# Patient Record
Sex: FEMALE | Race: WHITE | NOT HISPANIC OR LATINO | Employment: FULL TIME | URBAN - METROPOLITAN AREA
[De-identification: names, ages, dates, MRNs, and addresses within clinical notes are randomized per-mention and may not be internally consistent; named-entity substitution may affect disease eponyms.]

---

## 2017-06-22 ENCOUNTER — OFFICE VISIT (OUTPATIENT)
Dept: NEUROLOGY | Facility: CLINIC | Age: 42
End: 2017-06-22
Payer: COMMERCIAL

## 2017-06-22 VITALS
RESPIRATION RATE: 18 BRPM | DIASTOLIC BLOOD PRESSURE: 87 MMHG | WEIGHT: 200.19 LBS | HEART RATE: 93 BPM | BODY MASS INDEX: 35.47 KG/M2 | HEIGHT: 63 IN | SYSTOLIC BLOOD PRESSURE: 126 MMHG

## 2017-06-22 DIAGNOSIS — G43.719 CHRONIC MIGRAINE WITHOUT AURA, WITH INTRACTABLE MIGRAINE, SO STATED, WITHOUT MENTION OF STATUS MIGRAINOSUS: Primary | ICD-10-CM

## 2017-06-22 DIAGNOSIS — G47.9 DISORDERED SLEEP: ICD-10-CM

## 2017-06-22 DIAGNOSIS — Z72.0 TOBACCO ABUSE: ICD-10-CM

## 2017-06-22 DIAGNOSIS — I10 ESSENTIAL HYPERTENSION: ICD-10-CM

## 2017-06-22 PROCEDURE — 99999 PR PBB SHADOW E&M-EST. PATIENT-LVL III: CPT | Mod: PBBFAC,,, | Performed by: PSYCHIATRY & NEUROLOGY

## 2017-06-22 PROCEDURE — 99204 OFFICE O/P NEW MOD 45 MIN: CPT | Mod: S$GLB,,, | Performed by: PSYCHIATRY & NEUROLOGY

## 2017-06-22 RX ORDER — BUTALBITAL, ASPIRIN, AND CAFFEINE 325; 50; 40 MG/1; MG/1; MG/1
1 CAPSULE ORAL EVERY 6 HOURS PRN
Qty: 10 CAPSULE | Refills: 3 | Status: SHIPPED | OUTPATIENT
Start: 2017-06-22 | End: 2017-07-22

## 2017-06-22 RX ORDER — TOPIRAMATE 100 MG/1
100 CAPSULE, EXTENDED RELEASE ORAL DAILY
Qty: 30 CAPSULE | Refills: 11 | Status: SHIPPED | OUTPATIENT
Start: 2017-06-22 | End: 2017-07-27

## 2017-06-22 RX ORDER — ZOLMITRIPTAN 5 MG/1
1 SPRAY NASAL ONCE AS NEEDED
Qty: 6 EACH | Refills: 3 | Status: SHIPPED | OUTPATIENT
Start: 2017-06-22 | End: 2018-01-25

## 2017-06-22 RX ORDER — TOPIRAMATE 50 MG/1
50 TABLET, FILM COATED ORAL DAILY
COMMUNITY
End: 2017-06-22 | Stop reason: ALTCHOICE

## 2017-06-22 NOTE — PROGRESS NOTES
Please Check any Medications Tried for Headache    AED Neuromodulators  MAOIs  Ergot Alkaloids    Acetazolamide (Diamox) [] Phenelzine (Nardil) [] Dihydroergotamine (Migranal) [x]   Carbamazepine (Tegretol) [] Tranylcypromine (Parnate) [] Ergotamine (Ergomar) []   Gabapentin (Neurontin) [] Antihistamine/Serotonergic  Triptans    Lacosamide (Vimpat) [] Cyproheptadine (Periactin) [] Almotriptan (Axert) []   Lamotrigine (Lamictal) [] Antihypertensives  Eletriptan (Relpax) [x]   Levatiracetam (Keppra) [] Atenolol (Tenormin) [] Frovatriptan (Frova) []   Oxcarbazepine (Trileptal) [] Bisoprostol (Zebeta) [] Naratriptan (Amerge) []   Phenobarbital [] Candesartan (Atacand) [] Rizatriptan (Maxalt) [x]   Phenytoin (Dilantin) [x] Diltiazem (Cardizem) [] Sumatriptan (Imitrex) [x]   Pregabalin (Lyrica) [] Lisinopril (Prinivil, Zestril) [x] Zolmitriptan (Zomig) [x]   Primidone (Mysoline) [] Metoprolol (Toprol) [x] Combo Abortives    Tiagabine (Gabatril) [] Nadolol (Corgard) [] Butalbital and Acetaminophen (Bupap) []   Topiramate (Topamax)  (Trokendi) [x] Nicardipine (Cardene) []     Vigabatrin (Sabril) [] Nimodipine (Nimotop) [] Butalbital, Acetaminophen, and caffeine (Fioricet) [x]   Valproic Acid (Depakote) (Divalproex Sodium) [x] Propranolol (Inderal) [x]     Zonisamide (Zonegran) [] Telmisartan (Micardis) [] Butalbital, Aspirin, and caffeine (Fiorinal) [x]   Benzodiazepines  Timolol (Blocadren) []     Alprazolam (Xanax) [] Verapamil (Calan, Verelan) [] Butalbital, Caffeine, Acetaminophen, and Codeine (Fioricet with Codeine) []   Diazepam (Valium) [] NSAIDs      Lorazepam (Ativan) [] Acetaminophen (Tylenol) [x]     Clonazepam (Klonopin) [] Acetylsalicylic Acid (Aspirin) [x] Butalbital, Caffeine, Aspirin, and Codeine  (Fiorinal with Codeine) []   Antidepressants  Diclofenac (Cambia) [x]     Amitriptyline (Elavil) [] Ibuprofen (Motrin) [x]     Desipramine (Norpramin) [] Indomethacin (Indocin) [] Aspirin, Caffeine, and  Acetaminophen (Excedrin) (Goodys) [x]   Doxepin (Sinequan) [] Ketoprofen (Orudis) []     Fluoxetine (Prozac) [] Ketorolac (Toradol) [x] Acetaminophen, Dichloralphenazone, and Isometheptene (Midrin) []   Imipramine (Tofranil) [] Naproxen (Anaprox) (Aleve) [x]     Nortriptyline (Pamelor) [] Meclofenamic Acid (Meclomen) []     Venlafaxine (Effexor) [x] Meloxicam (Mobic) [x] Aspirin, Salicylamide, and Caffeine (BC Powder) [x]

## 2017-06-22 NOTE — PROGRESS NOTES
"Subjective:       Patient ID: Mckenzie Barrientos is a 42 y.o. female.    Chief Complaint: Headache    HPI   The patient is a pleasant 41 y/o female who presents to clinic to establish care after former Neurologist Dr. Zelalem Casey"s passing.   She had had migraines since the age of 13 that corresponded with her menstrual cycle.  They were regularly treated with aspirin with little problem.  At age 30 her migraines changed in frequency and intensity.  They became localized to either the R or L side or globally.  She describes her typical migraine as a throbbing pain that can be as intense as 10/10.  They are associated w/ dizziness, falls, blurred vision w/ loss of visual acuity. Previously has had Auras that look like halos around objects before they occur. Currently she is complaining of daily headaches that are on average 2/10 that intensify throughout the day that can progress to tension headache or a migraine.  Her current headaches get better with pressure and ice. Got "DOTH" piercing which she said helped her headaches for a short time 8 months ago. She has a Toradol injection that she can take if they progress for longer than 2 days.  Takes topomax 50mg daily.  Previously had done botox with Dr. Casey last August and is interested in getting more botox injections.  Has done lifestyle modification with [facility] and reducing stress which have helped.  Please see details of headache characteristics headache below.  Headache questionnaire     1. When did your Headaches start?          Began at age 13 and worsened at age [age]        2. Where are your headaches located?                        Primarily right frontal /temporal but also bilat and diffuse          3. Your headache's characteristics:                        Excruciating, Pressure, Throbbing, Pounding, Like a tight band,         4. How long does the headache last?                        Hours, days         5. How often does the headache occur?              "           daily        6. Are your headaches preceded or accompanied by other symptoms? yes                        If yes, please describe.  Visual disturbance/ blurred, nausea , dizziness         7. Does the headache awaken you at night? yes                        If so, how often? 3-4 times per month            8. Please yodit the word that best describes your headache's intensity:                         severe        9. Using a scale of 1 through 10, with 0 = no pain and 10 = the worst pain:                        What score is your headache now? 3                        What score is your headache at its worst? 8                        What score is your headache at its best? 3           10. Possible associated headache symptoms:  [x]  Sensitivity to light                                      [x] Dizziness                                        [] Nasal or sinus pressure/ pain                        [x] Sensitivity to noise                                     [x] Vertigo                                            [x] Problems with concentration  [x] Sensitivity to smells             [] Ringing in ears                     [x] Problems with memory                                            [x] Blurred vision                                 [x] Irritability                                         [x] Problems with task completion   [] Double vision                                 [x] Anger                                  [x]  Problems with relaxation  [] Loss of appetite                                         [] Anxiety                                           [x] Neck tightness, Neck pain  [x] Nausea                                                                 [] Nasal congestion  [x] Vomiting                                                                       11. Headache improving factors:  [] Sleep                                  [] Heat  [x] Darkness                                        [x] Ice  [x]  Local pressure                     [] Menses (period)  [] Massage                                        [x] Medications:          12. Headache worsening factors:   [] Fatigue                     [] Sneezing                                        [x] Changes in Weather  [x] Light             [] Bending Over           [] Stress  [x] Noise            [] Ovulation                                        [] Multiple Sclerosis Flare-Up  [x] Smells                      [] Menses                                          [] Food   [] Coughing                  [] Alcohol        13. Number of caffeinated drinks per day: 1        14. Number of diet drinks per day:  0           AED Neuromodulators   MAOIs   Ergot Alkaloids     Acetazolamide (Diamox) [] Phenelzine (Nardil) [] Dihydroergotamine (Migranal) [x]   Carbamazepine (Tegretol) [] Tranylcypromine (Parnate) [] Ergotamine (Ergomar) []   Gabapentin (Neurontin) [] Antihistamine/Serotonergic   Triptans     Lacosamide (Vimpat) [] Cyproheptadine (Periactin) [] Almotriptan (Axert) []   Lamotrigine (Lamictal) [] Antihypertensives   Eletriptan (Relpax) [x]   Levatiracetam (Keppra) [] Atenolol (Tenormin) [] Frovatriptan (Frova) []   Oxcarbazepine (Trileptal) [] Bisoprostol (Zebeta) [] Naratriptan (Amerge) []   Phenobarbital [] Candesartan (Atacand) [] Rizatriptan (Maxalt) [x]   Phenytoin (Dilantin) [x] Diltiazem (Cardizem) [] Sumatriptan (Imitrex) [x]   Pregabalin (Lyrica) [] Lisinopril (Prinivil, Zestril) [x] Zolmitriptan (Zomig) [x]   Primidone (Mysoline) [] Metoprolol (Toprol) [x] Combo Abortives     Tiagabine (Gabatril) [] Nadolol (Corgard) [] Butalbital and Acetaminophen (Bupap) []   Topiramate (Topamax)  (Trokendi) [x] Nicardipine (Cardene) []     Vigabatrin (Sabril) [] Nimodipine (Nimotop) [] Butalbital, Acetaminophen, and caffeine (Fioricet) [x]   Valproic Acid (Depakote) (Divalproex Sodium) [x] Propranolol (Inderal) [x]     Zonisamide (Zonegran) [] Telmisartan (Micardis) []  Butalbital, Aspirin, and caffeine (Fiorinal) [x]   Benzodiazepines   Timolol (Blocadren) []     Alprazolam (Xanax) [] Verapamil (Calan, Verelan) [] Butalbital, Caffeine, Acetaminophen, and Codeine (Fioricet with Codeine) []   Diazepam (Valium) [] NSAIDs       Lorazepam (Ativan) [] Acetaminophen (Tylenol) [x]     Clonazepam (Klonopin) [] Acetylsalicylic Acid (Aspirin) [x] Butalbital, Caffeine, Aspirin, and Codeine  (Fiorinal with Codeine) []   Antidepressants   Diclofenac (Cambia) [x]     Amitriptyline (Elavil) [] Ibuprofen (Motrin) [x]     Desipramine (Norpramin) [] Indomethacin (Indocin) [] Aspirin, Caffeine, and Acetaminophen (Excedrin) (Goodys) [x]       Review of Systems   Constitutional: Negative for activity change, appetite change, fatigue and fever.   HENT: Negative for congestion, dental problem, hearing loss, sinus pressure, tinnitus, trouble swallowing and voice change.    Eyes: Positive for photophobia and visual disturbance. Negative for pain and redness.   Respiratory: Negative for cough, chest tightness and shortness of breath.    Cardiovascular: Negative for chest pain, palpitations and leg swelling.   Gastrointestinal: Negative for abdominal pain, blood in stool, nausea and vomiting.   Endocrine: Negative for cold intolerance and heat intolerance.   Genitourinary: Negative for difficulty urinating, frequency, menstrual problem and urgency.   Musculoskeletal: Negative for arthralgias, back pain, gait problem, joint swelling, myalgias, neck pain and neck stiffness.   Skin: Negative.    Neurological: Positive for headaches. Negative for dizziness, tremors, seizures, syncope, facial asymmetry, speech difficulty, weakness, light-headedness and numbness.   Hematological: Negative for adenopathy. Does not bruise/bleed easily.   Psychiatric/Behavioral: Negative for agitation, behavioral problems, confusion, decreased concentration, self-injury, sleep disturbance and suicidal ideas. The patient is not  nervous/anxious and is not hyperactive.          Past Medical History:   Diagnosis Date    Acid reflux 3/1/2016    Hypertension     Kidney stones     Migraines     Obesity 3/1/2016    Tobacco abuse 3/1/2016     Past Surgical History:   Procedure Laterality Date    HYSTERECTOMY      partial, still has ovaries    laproscopy      LIPOMA RESECTION      Right Inner thigh     Family History   Problem Relation Age of Onset    Leukemia Mother     Lung cancer Father     Diabetes Father     Breast cancer Sister      Social History     Social History    Marital status:      Spouse name: N/A    Number of children: N/A    Years of education: N/A     Occupational History    Not on file.     Social History Main Topics    Smoking status: Current Every Day Smoker     Packs/day: 0.15     Types: Cigarettes     Start date: 6/19/1998    Smokeless tobacco: Never Used    Alcohol use Yes      Comment: rarely: cocktails    Drug use: No    Sexual activity: Not on file     Other Topics Concern    Not on file     Social History Narrative    No narrative on file     Review of patient's allergies indicates:   Allergen Reactions    Hydrochlorothiazide      Other reaction(s): feels like she is crawling out of her skin, feels like she is crawling out of her skin, feels like she is crawling out of her skin    Novocain [procaine]      Other reaction(s): Heart Palations, Heart Palations       Current Outpatient Prescriptions:     ketorolac (TORADOL) 15 mg/mL injection, inject 1-2ml INTRA-MUSCULARLY EVERY 6 HOURS AS NEEDED for up to 5 days total use, Disp: , Rfl: 2    lisinopril 10 MG tablet, Take 1 tablet (10 mg total) by mouth once daily., Disp: 90 tablet, Rfl: 0    omeprazole (PRILOSEC) 20 MG capsule, Take 1 capsule (20 mg total) by mouth once daily., Disp: 90 capsule, Rfl: 0    butalbital-aspirin-caffeine -40 mg (FIORINAL) -40 mg Cap, Take 1 capsule by mouth every 6 (six) hours as needed., Disp: 10  capsule, Rfl: 3    TROKENDI  mg Cp24, Take 1 capsule (100 mg total) by mouth once daily., Disp: 30 capsule, Rfl: 11    zolmitriptan (ZOMIG) 5 mg Spry, 1 spray by Nasal route once as needed., Disp: 6 each, Rfl: 3      Objective:      Vitals:    06/22/17 0901   BP: 126/87   Pulse: 93   Resp: 18     Body mass index is 35.46 kg/m².    Physical Exam    Constitutional:   She appears well-developed and well-nourished. She is well groomed    HENT:    Head: Atraumatic, oral and nasal mucosa intact  Eyes: Conjunctivae and EOM are normal. Pupils are equal, round, and reactive to light OU  Neck: Neck supple. No thyromegaly present  Cardiovascular: Normal rate and normal heart sounds  No murmur heard  Pulmonary/Chest: Effort normal and breath sounds normal  Musculoskeletal: Normal range of motion. No joint stiffness. No vertebral point tenderness  Skin: Skin is warm and dry  Psychiatric: Normal mood and affect     Neuro exam:    Mental status:  Awake, attentive, Alert, oriented to self, place, year and month  Language function is intact  Naming, repetition and spontaneous meaningful speech expression are intact    Cranial Nerves:  Smell was not formally evaluated  Cranial Nerves II - XII: intact  Pursuits were smooth, normal saccades, no nystagmus OU  Funduscopic exam - disc were flat and pink, no exudates or hemorrhages OU  Motor - facial movement was symmetrical and normal     Palate moved well and was symmetrical with normal palatal and oral sensation  Tongue movements were full    Coordination:     Rapid alternating movements and rapid finger tapping - normal bilaterally  Finger to nose - normal and symmetric bilaterally   Heel to shin test - normal and symmetric bilaterally   Arm roll - smooth and symmetric   No intentional or positional tremor.     Motor:  Normal muscle bulk and symmetry. No fasciculations were noted    No pronator drift  Strength 5/5 bilaterally     Reflexes:  Tendon reflexes were 2 + at biceps,  triceps, brachioradialis, patellar, and Achilles bilaterally  On plantar stimulation toes were down going bilaterally  No clonus was noted     Sensory: Intact to light touch, pin prick in all extremities.     Gait: Romberg absent. Normal gait. Normal arm swing and turns.    Review of Data: normal CBC, CMP, Thyroid function        Assessment and Plan   Chronic Migraine without aura. The patient suffers from headaches more than 15 days a month lasting more than 4 hours a day with no relief of symptoms despite trying multiple medications including but not limited to Topamax, Depakote, Dilantin, Lisinopril, Propranolol, Metoprolol, Venlafaxine and many others. She responded well to Botox under Dr. Casey, but left the area. The patient will be an ideal candidate for Botox. We are planning for 3 treatments 3 months apart and aiming for at least 50% improvement in the symptoms. If we see no improvement after 3 treatments, we will discontinue the injections.  Episodic Migraine with Aura, stable.  Increase Topiramate to 100 mg. Start Trokendi 100 mg QHS  For acute attacks start with Zomig 5 mg NS, rescue with Fiorinal and ultimately Toradol if headache persists  RTC in 3 months with diary    Lissette Block M.D  Medical Director, Headache and Facial Pain  [facility]

## 2017-06-22 NOTE — PROGRESS NOTES
Headache questionnaire    1. When did your Headaches start?          Began at age 13 and worsened at age [age]      2. Where are your headaches located?   Primarily right frontal /temporal but also bilat and diffuse        3. Your headache's characteristics:   Excruciating, Pressure, Throbbing, Pounding, Like a tight band,       4. How long does the headache last?   Hours, days       5. How often does the headache occur?   daily      6. Are your headaches preceded or accompanied by other symptoms? yes   If yes, please describe.  Visual disturbance/ blurred, nausea , dizziness       7. Does the headache awaken you at night? yes   If so, how often? 3-4 times per month         8. Please yodit the word that best describes your headache's intensity:    severe      9. Using a scale of 1 through 10, with 0 = no pain and 10 = the worst pain:   What score is your headache now? 3   What score is your headache at its worst? 8   What score is your headache at its best? 3        10. Possible associated headache symptoms:  [x]  Sensitivity to light  [x] Dizziness  [] Nasal or sinus pressure/ pain   [x] Sensitivity to noise  [x] Vertigo  [x] Problems with concentration  [x] Sensitivity to smells  [] Ringing in ears  [x] Problems with memory    [x] Blurred vision  [x] Irritability  [x] Problems with task completion   [] Double vision  [x] Anger  [x]  Problems with relaxation  [] Loss of appetite  [] Anxiety  [x] Neck tightness, Neck pain  [x] Nausea   [] Nasal congestion  [x] Vomiting         11. Headache improving factors:  [] Sleep  [] Heat  [x] Darkness  [x] Ice  [x] Local pressure [] Menses (period)  [] Massage   [x] Medications:        12. Headache worsening factors:   [] Fatigue [] Sneezing  [x] Changes in Weather  [x] Light [] Bending Over [] Stress  [x] Noise [] Ovulation  [] Multiple Sclerosis Flare-Up  [x] Smells  [] Menses  [] Food   [] Coughing [] Alcohol      13. Number of caffeinated drinks per day: 1      14. Number  of diet drinks per day:  0      15. Have you seen any other [facility]rologists within the last 3 years?  no

## 2017-06-22 NOTE — MEDICAL/APP STUDENT
"Subjective:       Patient ID: Mckenzie Barrientos is a 42 y.o. R handed female.    Chief Complaint: Migraine and Headache  PMH: HTN  HPI   Ptx presents to clinic to establish care with Dr. Block after former Neurologist Dr. Zelalem Casey"s passing.   She had had migraines since the age of 13 that corresponded with her menstrual cycle.  They were regularly treated with aspirin with little problem.  At age 30 her migraines changed in frequency and intensity.  They became localized to either the R or L side or globally.  She describes her typical migraine as a throbbing pain that can be as intense as 10/10.  They are associated w/ dizziness, falls, blurred vision w/ loss of visual acuity. Previously has had Auras that look like halos around objects before they occur. Currently she is complaining of daily headaches that are on average 2/10 that intensify throughout the day that can progress to tension headache or a migraine.  Her current headaches get better with pressure and ice. Got "DOTH" piercing which she said helped her headaches for a short time 8 months ago. She has a Toradol injection that she can take if they progress for longer than 2 days.  Takes topomax 50mg daily.  Previously had done botox with Dr. Casey last August and is interested in getting more botox injections.  Has done lifestyle modification with [facility] and reducing stress which have helped.    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  Enjoys hiking with her family.  Smoking 5 cigarettes per day and cutting down/interested in quitting.  Non drinker.        Meds: Topomax 50mg 1x   PRN Toradol injection    Review of Systems    HEENT: Pos for vision changes, Dizziness; Neg for hearing and taste changes  CVS: neg for chest pain or palpitations  Resp: Neg for sob or pleuritic chest pain  Abdo: Neg for abdo pain or changes in bowel habits.  : Neg " for bladder changes or dysuria  Skin: neg for skin changes or rash      Objective:      Physical Exam      Gen: well developed well nourished female not in any distress  HEENT: EERTL; Neck supple trachea midline  CVS: P NRR; HS dual no added sounds; no murmurs rubs or gallops  Pulm: Lungs clear with normal breath sounds  Abdo: soft non tender non distended; bowel sounds present  Neuro:   CN: I-XII grossly intact      UL: Normal tone    Normal sensation to light and sharp touch    5/5 strength bilaterally in all flexors and extensors    2+ reflexes bilaterally    No dysmetria       LL: Normal     Normal sensation to light and sharp touch    5/5 strength bilaterally in all flexors and extensors    2+ reflexes bilaterally    No dysmetria    Psych: oriented to TPP        Assessment:       Mrs. Barrientos is a 41yo F with PMH of HTN who is presenting with continued Migraine.      No diagnosis found.    Plan:       1. Migraine: 100mg Trokendi xr; Zomig Nasal spray; Fiorinal   -Plan for botox injections   -Headache journal 3months  2. RTC 3 months.

## 2017-06-23 ENCOUNTER — TELEPHONE (OUTPATIENT)
Dept: NEUROLOGY | Facility: CLINIC | Age: 42
End: 2017-06-23

## 2017-06-23 NOTE — TELEPHONE ENCOUNTER
Received message from [facility] stating the botox would need to come from [facility]. Spoke with [email or web address]. Patient enrolled. RX given to them. They will start working on this request and contact us in 5 business days with a response.

## 2017-07-06 ENCOUNTER — PATIENT MESSAGE (OUTPATIENT)
Dept: NEUROLOGY | Facility: CLINIC | Age: 42
End: 2017-07-06

## 2017-07-21 ENCOUNTER — PATIENT MESSAGE (OUTPATIENT)
Dept: NEUROLOGY | Facility: CLINIC | Age: 42
End: 2017-07-21

## 2017-07-21 ENCOUNTER — TELEPHONE (OUTPATIENT)
Dept: NEUROSURGERY | Facility: CLINIC | Age: 42
End: 2017-07-21

## 2017-07-21 NOTE — TELEPHONE ENCOUNTER
Spoke with [facility]. Botox scheduled for delivery on 07/26/17. Patient scheduled 07/27/17 at 9:45am. Patient notified through [facility].

## 2017-07-21 NOTE — TELEPHONE ENCOUNTER
----- Message from Eduar Reis sent at 7/21/2017  9:23 AM CDT -----  Contact: [facility] [phone]  States that they need some information on delivering the Botox.  They will need the address, the office hours and who's attention to send it to .   Please call [phone] option [phone].  Thank you

## 2017-07-25 ENCOUNTER — TELEPHONE (OUTPATIENT)
Dept: NEUROLOGY | Facility: CLINIC | Age: 42
End: 2017-07-25

## 2017-07-25 NOTE — TELEPHONE ENCOUNTER
----- Message from Raman Limon sent at 7/25/2017  2:11 PM CDT -----  Contact: [facility]  [phone] calling to speak to some one about patient. Thanks!

## 2017-07-26 ENCOUNTER — TELEPHONE (OUTPATIENT)
Dept: NEUROLOGY | Facility: CLINIC | Age: 42
End: 2017-07-26

## 2017-07-26 NOTE — TELEPHONE ENCOUNTER
----- Message from Nathaly Doran sent at 7/26/2017  2:40 PM CDT -----  Contact: jimbo in [employer] ext#[phone]   jimbo in [employer] ext#[phone] checking status was medication received

## 2017-07-27 ENCOUNTER — PROCEDURE VISIT (OUTPATIENT)
Dept: NEUROLOGY | Facility: CLINIC | Age: 42
End: 2017-07-27
Payer: COMMERCIAL

## 2017-07-27 VITALS
HEART RATE: 79 BPM | BODY MASS INDEX: 35.2 KG/M2 | WEIGHT: 198.63 LBS | RESPIRATION RATE: 17 BRPM | SYSTOLIC BLOOD PRESSURE: 128 MMHG | DIASTOLIC BLOOD PRESSURE: 65 MMHG | HEIGHT: 63 IN

## 2017-07-27 DIAGNOSIS — G43.719 CHRONIC MIGRAINE WITHOUT AURA, WITH INTRACTABLE MIGRAINE, SO STATED, WITHOUT MENTION OF STATUS MIGRAINOSUS: Primary | ICD-10-CM

## 2017-07-27 PROCEDURE — 64615 CHEMODENERV MUSC MIGRAINE: CPT | Mod: S$GLB,,, | Performed by: PSYCHIATRY & NEUROLOGY

## 2017-07-27 RX ORDER — TOPIRAMATE 50 MG/1
50 TABLET, FILM COATED ORAL 2 TIMES DAILY
Qty: 60 TABLET | Refills: 11 | Status: SHIPPED | OUTPATIENT
Start: 2017-07-27 | End: 2018-08-08 | Stop reason: SDUPTHER

## 2017-07-27 NOTE — PROCEDURES
Procedures     BOTOX PROCEDURE    PROCEDURE PERFORMED: Botulinum toxin injection (37567)    CLINICAL INDICATION: G43.719    A time out was conducted just before the start of the procedure to verify the correct patient and procedure, procedure location, and all relevant critical information.       This is the first Botox injections and I am aiming for at least 50%  improvement in the patient's symptoms. Frequency of treatment is every 3 months unless no response to the treatments, at which time we will discontinue the injections.     DESCRIPTION OF PROCEDURE: After obtaining informed consent and under aseptic technique, a total of 155 units of botulinum toxin type A were injected in the following muscles: Procerus 5 units,  5 units bilaterally, frontalis 20 units, temporalis 20 units bilaterally, occipitalis 15 units, upper cervical paraspinals 10 units bilaterally and trapezius 15 units bilaterally. The patient was given a total of 155 units in 31 sites.The patient tolerated the procedure well. There were no complications. The patient was given a prescription for repeat treatment in 3 months.     Unavoidable waste 45 units    Lissette Block M.D  Medical Director, Headache and Facial Pain  [facility]

## 2017-08-08 PROCEDURE — 64615 CHEMODENERV MUSC MIGRAINE: CPT | Mod: S$GLB,,, | Performed by: PSYCHIATRY & NEUROLOGY

## 2017-08-24 NOTE — PROCEDURES
Procedures     BOTOX PROCEDURE    PROCEDURE PERFORMED: Botulinum toxin injection (70486)    CLINICAL INDICATION: G43.719    A time out was conducted just before the start of the procedure to verify the correct patient and procedure, procedure location, and all relevant critical information.     This is the first Botox injections and I am aiming for at least 50%  improvement in the patient's symptoms. Frequency of treatment is every 3 months unless no response to the treatments, at which time we will discontinue the injections.     DESCRIPTION OF PROCEDURE: After obtaining informed consent and under   aseptic technique, a total of 155 units of botulinum toxin type A were   injected in the following muscles: Procerus 5 units,  5 units   bilaterally, frontalis 20 units, temporalis 20 units bilaterally,   occipitalis 15 units, upper cervical paraspinals 10 units bilaterally and trapezius 15 units bilaterally. The patient was given a total of 155 units in 31 sites.The patient tolerated the procedure well. There were no complications. The patient was given a prescription for repeat treatment in 3 months.     Unavoidable waste 45 units    Lissette Block M.D  Medical Director, Headache and Facial Pain  [facility]

## 2017-10-16 ENCOUNTER — TELEPHONE (OUTPATIENT)
Dept: NEUROLOGY | Facility: CLINIC | Age: 42
End: 2017-10-16

## 2017-10-19 ENCOUNTER — PROCEDURE VISIT (OUTPATIENT)
Dept: NEUROLOGY | Facility: CLINIC | Age: 42
End: 2017-10-19
Payer: COMMERCIAL

## 2017-10-19 ENCOUNTER — TELEPHONE (OUTPATIENT)
Dept: NEUROLOGY | Facility: CLINIC | Age: 42
End: 2017-10-19

## 2017-10-19 VITALS
HEART RATE: 80 BPM | RESPIRATION RATE: 20 BRPM | WEIGHT: 195 LBS | HEIGHT: 63 IN | SYSTOLIC BLOOD PRESSURE: 130 MMHG | DIASTOLIC BLOOD PRESSURE: 87 MMHG | BODY MASS INDEX: 34.55 KG/M2

## 2017-10-19 DIAGNOSIS — G43.719 INTRACTABLE CHRONIC MIGRAINE WITHOUT AURA AND WITHOUT STATUS MIGRAINOSUS: ICD-10-CM

## 2017-10-19 PROCEDURE — 64615 CHEMODENERV MUSC MIGRAINE: CPT | Mod: S$GLB,,, | Performed by: PSYCHIATRY & NEUROLOGY

## 2017-10-19 RX ORDER — KETOROLAC TROMETHAMINE 30 MG/ML
INJECTION, SOLUTION INTRAMUSCULAR; INTRAVENOUS
Qty: 4 ML | Refills: 3 | Status: SHIPPED | OUTPATIENT
Start: 2017-10-19 | End: 2019-02-14 | Stop reason: SDUPTHER

## 2017-10-19 NOTE — PROCEDURES
Procedures   and Follow Up Visit    The Botox injections have achieved near 50%  improvement in the patient's symptoms. Migraines have been reduced at least 7 days per month and the number of cumulative hours suffering with headaches has been reduced at least 100 total hours per month. Frequency of treatment is every 3 months unless no response to the treatments, at which time we will discontinue the injections.        ROS: Positive for photophobia, phonophobia, nausea, insomnia     Past Medical History:   Diagnosis Date    Acid reflux 3/1/2016    Hypertension     Kidney stones     Migraines     Obesity 3/1/2016    Tobacco abuse 3/1/2016         Current Outpatient Prescriptions   Medication Sig    ketorolac (TORADOL) 15 mg/mL injection inject 1-2ml INTRA-MUSCULARLY EVERY 6 HOURS AS NEEDED for up to 5 days total use    lisinopril 10 MG tablet Take 1 tablet (10 mg total) by mouth once daily.    omeprazole (PRILOSEC) 20 MG capsule Take 1 capsule (20 mg total) by mouth once daily.    topiramate (TOPAMAX) 50 MG tablet Take 1 tablet (50 mg total) by mouth 2 (two) times daily.    zolmitriptan (ZOMIG) 5 mg Spry 1 spray by Nasal route once as needed.     Current Facility-Administered Medications   Medication    [COMPLETED] onabotulinumtoxina injection 200 Units          Vitals:    10/19/17 0857   BP: 130/87   Pulse: 80   Resp: 20     Body mass index is 34.54 kg/m².    Physical Exam:  Alert and fully oriented   Lungs CTA  Heart RRR  CN II-XII intact  Motor normal bulk and tone, symmetric strength  Coordination intact FTN  Sensory in tact to LT  Gait: normal, pace and base     Data review:    Lab Results   Component Value Date     01/04/2016    K 4.5 01/04/2016    CL 99 01/04/2016    CO2 30 (H) 01/04/2016    BUN 14 01/04/2016    CREATININE 0.88 01/04/2016    GLU 73 01/04/2016    AST 29 01/04/2016    ALT 43 01/04/2016    ALBUMIN 3.9 01/04/2016    PROT 6.8 01/04/2016    BILITOT 0.6 01/04/2016    CHOL 180  09/22/2015    HDL 36 (L) 09/22/2015    LDLCALC 117 09/22/2015    TRIG 133 09/22/2015       Lab Results   Component Value Date    WBC 9.25 01/04/2016    HGB 14.9 01/04/2016    HCT 43.9 01/04/2016    MCV 92 01/04/2016     01/04/2016       Lab Results   Component Value Date    TSH 1.860 01/04/2016     No results found for this or any previous visit.    A/P:     Chronic Migraine responding to Botox as expected. Continue treatment until patient in true remission, meaning that the patient stays headache free when Botox wears off in 10 to 12 weeks. That will be the time to stop. Refill IM Toradol and trial of Midrin. Take 2 caps at onset of headache escalation then repeat 1 cap every hour to a max of 4 per day 2 days per week    Encouraged the patient to comply with with early acute intervention for escalations    BOTOX PROCEDURE    PROCEDURE PERFORMED: Botulinum toxin injection (68299)    CLINICAL INDICATION: G43.719    A time out was conducted just before the start of the procedure to verify the correct patient and procedure, procedure location, and all relevant critical information.       DESCRIPTION OF PROCEDURE: After obtaining informed consent and under aseptic technique, a total of 155 units of botulinum toxin type A were injected in the following muscles: Procerus 5 units,  5 units bilaterally, frontalis 20 units, temporalis 20 units bilaterally, occipitalis 15 units, upper cervical paraspinals 10 units bilaterally and trapezius 15 units bilaterally. The patient was given a total of 155 units in 31 sites.The patient tolerated the procedure well. There were no complications. The patient was given a prescription for repeat treatment in 3 months.     Unavoidable waste 45 units          Lissette Block M.D  Medical Director, Headache and Facial Pain  [facility]

## 2017-10-19 NOTE — TELEPHONE ENCOUNTER
----- Message from Nova Temple sent at 10/19/2017  9:58 AM CDT -----  Contact: [facility]  [facility] calling regarding prescription for ketorolac just sent this morning.  [facility] - - [address] [address]  Phone: [phone] Fax: [phone]

## 2017-10-19 NOTE — TELEPHONE ENCOUNTER
Spoke with Paty at [facility]. Stated that the new prescription for the Ketorolac states it's 60mg/2ml, but the instructions doesn't state if the patient should do 1-2ml as a dose; wanted to clarify due to the patient was previously on 15mg/ml and was doing 1-2ml per dose. Please advise.

## 2018-01-24 ENCOUNTER — PATIENT MESSAGE (OUTPATIENT)
Dept: NEUROLOGY | Facility: CLINIC | Age: 43
End: 2018-01-24

## 2019-09-27 PROBLEM — N73.6 PELVIC ADHESIVE DISEASE: Status: ACTIVE | Noted: 2019-09-27

## 2019-09-27 PROBLEM — R19.00 MASS OF PELVIS: Status: ACTIVE | Noted: 2019-09-27

## 2019-09-27 PROBLEM — N83.519: Status: ACTIVE | Noted: 2019-09-27

## 2022-06-09 ENCOUNTER — TELEPHONE (OUTPATIENT)
Dept: PULMONOLOGY | Facility: HOSPITAL | Age: 47
End: 2022-06-09
Payer: COMMERCIAL

## 2022-06-09 ENCOUNTER — LAB VISIT (OUTPATIENT)
Dept: LAB | Facility: HOSPITAL | Age: 47
End: 2022-06-09
Attending: INTERNAL MEDICINE
Payer: COMMERCIAL

## 2022-06-09 ENCOUNTER — OFFICE VISIT (OUTPATIENT)
Dept: FAMILY MEDICINE | Facility: CLINIC | Age: 47
End: 2022-06-09
Payer: COMMERCIAL

## 2022-06-09 VITALS
DIASTOLIC BLOOD PRESSURE: 76 MMHG | HEART RATE: 81 BPM | HEIGHT: 63 IN | SYSTOLIC BLOOD PRESSURE: 112 MMHG | WEIGHT: 200.19 LBS | TEMPERATURE: 98 F | BODY MASS INDEX: 35.47 KG/M2

## 2022-06-09 DIAGNOSIS — K21.9 GASTROESOPHAGEAL REFLUX DISEASE WITHOUT ESOPHAGITIS: ICD-10-CM

## 2022-06-09 DIAGNOSIS — I10 PRIMARY HYPERTENSION: ICD-10-CM

## 2022-06-09 DIAGNOSIS — G43.909 MIGRAINE WITHOUT STATUS MIGRAINOSUS, NOT INTRACTABLE, UNSPECIFIED MIGRAINE TYPE: Primary | ICD-10-CM

## 2022-06-09 DIAGNOSIS — E55.9 AVITAMINOSIS D: ICD-10-CM

## 2022-06-09 DIAGNOSIS — Z11.59 ENCOUNTER FOR HEPATITIS C SCREENING TEST FOR LOW RISK PATIENT: ICD-10-CM

## 2022-06-09 DIAGNOSIS — Z13.220 ENCOUNTER FOR LIPID SCREENING FOR CARDIOVASCULAR DISEASE: ICD-10-CM

## 2022-06-09 DIAGNOSIS — F17.200 TOBACCO DEPENDENCE: ICD-10-CM

## 2022-06-09 DIAGNOSIS — Z13.6 ENCOUNTER FOR LIPID SCREENING FOR CARDIOVASCULAR DISEASE: ICD-10-CM

## 2022-06-09 DIAGNOSIS — R53.83 FATIGUE, UNSPECIFIED TYPE: ICD-10-CM

## 2022-06-09 DIAGNOSIS — Z12.31 ENCOUNTER FOR SCREENING MAMMOGRAM FOR BREAST CANCER: ICD-10-CM

## 2022-06-09 DIAGNOSIS — R06.83 SNORING: ICD-10-CM

## 2022-06-09 DIAGNOSIS — R40.0 DAYTIME SOMNOLENCE: ICD-10-CM

## 2022-06-09 PROBLEM — R19.00 MASS OF PELVIS: Status: RESOLVED | Noted: 2019-09-27 | Resolved: 2022-06-09

## 2022-06-09 PROBLEM — N83.519: Status: RESOLVED | Noted: 2019-09-27 | Resolved: 2022-06-09

## 2022-06-09 LAB
25(OH)D3+25(OH)D2 SERPL-MCNC: 36 NG/ML (ref 30–96)
ALBUMIN SERPL BCP-MCNC: 3.9 G/DL (ref 3.5–5.2)
ALP SERPL-CCNC: 84 U/L (ref 55–135)
ALT SERPL W/O P-5'-P-CCNC: 24 U/L (ref 10–44)
ANION GAP SERPL CALC-SCNC: 10 MMOL/L (ref 8–16)
AST SERPL-CCNC: 19 U/L (ref 10–40)
BILIRUB SERPL-MCNC: 0.4 MG/DL (ref 0.1–1)
BUN SERPL-MCNC: 18 MG/DL (ref 6–20)
CALCIUM SERPL-MCNC: 9.7 MG/DL (ref 8.7–10.5)
CHLORIDE SERPL-SCNC: 105 MMOL/L (ref 95–110)
CHOLEST SERPL-MCNC: 237 MG/DL (ref 120–199)
CHOLEST/HDLC SERPL: 5.5 {RATIO} (ref 2–5)
CO2 SERPL-SCNC: 26 MMOL/L (ref 23–29)
CREAT SERPL-MCNC: 1 MG/DL (ref 0.5–1.4)
ERYTHROCYTE [DISTWIDTH] IN BLOOD BY AUTOMATED COUNT: 12 % (ref 11.5–14.5)
EST. GFR  (AFRICAN AMERICAN): >60 ML/MIN/1.73 M^2
EST. GFR  (NON AFRICAN AMERICAN): >60 ML/MIN/1.73 M^2
GLUCOSE SERPL-MCNC: 92 MG/DL (ref 70–110)
HCT VFR BLD AUTO: 49 % (ref 37–48.5)
HDLC SERPL-MCNC: 43 MG/DL (ref 40–75)
HDLC SERPL: 18.1 % (ref 20–50)
HGB BLD-MCNC: 16 G/DL (ref 12–16)
LDLC SERPL CALC-MCNC: 152.6 MG/DL (ref 63–159)
MCH RBC QN AUTO: 31.3 PG (ref 27–31)
MCHC RBC AUTO-ENTMCNC: 32.7 G/DL (ref 32–36)
MCV RBC AUTO: 96 FL (ref 82–98)
NONHDLC SERPL-MCNC: 194 MG/DL
PLATELET # BLD AUTO: 239 K/UL (ref 150–450)
PMV BLD AUTO: 10.8 FL (ref 9.2–12.9)
POTASSIUM SERPL-SCNC: 4.4 MMOL/L (ref 3.5–5.1)
PROT SERPL-MCNC: 6.8 G/DL (ref 6–8.4)
RBC # BLD AUTO: 5.11 M/UL (ref 4–5.4)
SODIUM SERPL-SCNC: 141 MMOL/L (ref 136–145)
TRIGL SERPL-MCNC: 207 MG/DL (ref 30–150)
TSH SERPL DL<=0.005 MIU/L-ACNC: 2.1 UIU/ML (ref 0.4–4)
WBC # BLD AUTO: 9.38 K/UL (ref 3.9–12.7)

## 2022-06-09 PROCEDURE — 3008F PR BODY MASS INDEX (BMI) DOCUMENTED: ICD-10-PCS | Mod: CPTII,S$GLB,, | Performed by: INTERNAL MEDICINE

## 2022-06-09 PROCEDURE — 82306 VITAMIN D 25 HYDROXY: CPT | Performed by: INTERNAL MEDICINE

## 2022-06-09 PROCEDURE — 3078F PR MOST RECENT DIASTOLIC BLOOD PRESSURE < 80 MM HG: ICD-10-PCS | Mod: CPTII,S$GLB,, | Performed by: INTERNAL MEDICINE

## 2022-06-09 PROCEDURE — 99999 PR PBB SHADOW E&M-EST. PATIENT-LVL IV: ICD-10-PCS | Mod: PBBFAC,,, | Performed by: INTERNAL MEDICINE

## 2022-06-09 PROCEDURE — 3078F DIAST BP <80 MM HG: CPT | Mod: CPTII,S$GLB,, | Performed by: INTERNAL MEDICINE

## 2022-06-09 PROCEDURE — 4010F ACE/ARB THERAPY RXD/TAKEN: CPT | Mod: CPTII,S$GLB,, | Performed by: INTERNAL MEDICINE

## 2022-06-09 PROCEDURE — 85027 COMPLETE CBC AUTOMATED: CPT | Performed by: INTERNAL MEDICINE

## 2022-06-09 PROCEDURE — 3008F BODY MASS INDEX DOCD: CPT | Mod: CPTII,S$GLB,, | Performed by: INTERNAL MEDICINE

## 2022-06-09 PROCEDURE — 99999 PR PBB SHADOW E&M-EST. PATIENT-LVL IV: CPT | Mod: PBBFAC,,, | Performed by: INTERNAL MEDICINE

## 2022-06-09 PROCEDURE — 1159F MED LIST DOCD IN RCRD: CPT | Mod: CPTII,S$GLB,, | Performed by: INTERNAL MEDICINE

## 2022-06-09 PROCEDURE — 99204 OFFICE O/P NEW MOD 45 MIN: CPT | Mod: S$GLB,,, | Performed by: INTERNAL MEDICINE

## 2022-06-09 PROCEDURE — 86803 HEPATITIS C AB TEST: CPT | Performed by: INTERNAL MEDICINE

## 2022-06-09 PROCEDURE — 80061 LIPID PANEL: CPT | Performed by: INTERNAL MEDICINE

## 2022-06-09 PROCEDURE — 36415 COLL VENOUS BLD VENIPUNCTURE: CPT | Mod: PO | Performed by: INTERNAL MEDICINE

## 2022-06-09 PROCEDURE — 99204 PR OFFICE/OUTPT VISIT, NEW, LEVL IV, 45-59 MIN: ICD-10-PCS | Mod: S$GLB,,, | Performed by: INTERNAL MEDICINE

## 2022-06-09 PROCEDURE — 80053 COMPREHEN METABOLIC PANEL: CPT | Performed by: INTERNAL MEDICINE

## 2022-06-09 PROCEDURE — 84443 ASSAY THYROID STIM HORMONE: CPT | Performed by: INTERNAL MEDICINE

## 2022-06-09 PROCEDURE — 4010F PR ACE/ARB THEARPY RXD/TAKEN: ICD-10-PCS | Mod: CPTII,S$GLB,, | Performed by: INTERNAL MEDICINE

## 2022-06-09 PROCEDURE — 1159F PR MEDICATION LIST DOCUMENTED IN MEDICAL RECORD: ICD-10-PCS | Mod: CPTII,S$GLB,, | Performed by: INTERNAL MEDICINE

## 2022-06-09 PROCEDURE — 3074F SYST BP LT 130 MM HG: CPT | Mod: CPTII,S$GLB,, | Performed by: INTERNAL MEDICINE

## 2022-06-09 PROCEDURE — 3074F PR MOST RECENT SYSTOLIC BLOOD PRESSURE < 130 MM HG: ICD-10-PCS | Mod: CPTII,S$GLB,, | Performed by: INTERNAL MEDICINE

## 2022-06-09 RX ORDER — DEXLANSOPRAZOLE 60 MG/1
60 CAPSULE, DELAYED RELEASE ORAL DAILY
Qty: 30 CAPSULE | Refills: 5 | Status: SHIPPED | OUTPATIENT
Start: 2022-06-09 | End: 2023-10-19

## 2022-06-09 RX ORDER — BUPROPION HYDROCHLORIDE 150 MG/1
150 TABLET, EXTENDED RELEASE ORAL 2 TIMES DAILY
Qty: 60 TABLET | Refills: 0 | Status: SHIPPED | OUTPATIENT
Start: 2022-06-09 | End: 2022-07-12

## 2022-06-09 NOTE — PROGRESS NOTES
Assessment/Plan:    Problem List Items Addressed This Visit        Neuro    Migraine without status migrainosus, not intractable - Primary    Overview     -previously followed by neurology  -remains on Trokendi and Cymbalta  -improvement of severity of migraines but still occurring frequently  -plan to refer back to neurology to discuss other treatment options           Relevant Orders    Ambulatory referral/consult to Neurology       Cardiac/Vascular    Primary hypertension    Overview     Hypertension Medications             lisinopriL 10 MG tablet TAKE 1 TABLET BY MOUTH once DAILY      -at goal today  -continue lifestyle modification with low sodium diet and exercise   -discussed hypertension disease course and importance of treating high blood pressure  -patient understood and advised of risk of untreated blood pressure.  ER precautions were given   for symptoms of hypertensive urgency and emergency.           Relevant Orders    Comprehensive Metabolic Panel       Endocrine    Avitaminosis D    Overview     -checking vit d with labs  -currently on 2000 units daily           Relevant Orders    Vitamin D       GI    Acid reflux    Relevant Medications    dexlansoprazole (DEXILANT) 60 mg capsule       Other    Tobacco dependence    Overview     -the patient was counseled on the dangers of tobacco use  -reviewed strategies to maximize success, including counseling, nicotine replacement therapy and pharmacologic option.   -tobacco cessation class offered  -plan to start wellbutrin    Time spent: 3-10 minutes             Relevant Medications    buPROPion (WELLBUTRIN SR) 150 MG TBSR 12 hr tablet      Other Visit Diagnoses     Fatigue, unspecified type        Relevant Orders    Comprehensive Metabolic Panel    CBC Without Differential    TSH    Vitamin D    Encounter for lipid screening for cardiovascular disease        Relevant Orders    Lipid Panel    Encounter for hepatitis C screening test for low risk patient         Relevant Orders    Hepatitis C Antibody    Daytime somnolence        Relevant Orders    Home Sleep Studies    Snoring        Relevant Orders    Home Sleep Studies    Encounter for screening mammogram for breast cancer        Relevant Orders    Mammo Digital Screening Bilat w/ Peyman          Follow up for lab: cbc,cmp,lipid,vit d,tsh,hep c; MMG; neurology .    Violeta Tellez MD  ______________________________________________________________________________________________________________________________    CC: Establish care    HPI:    Patient is a new patient to me here to establish care. Patient is a 45 yo WF with a PMH of HTN, chronic migraines, GERD, tobacco use.    Previous PCP: HELEN Godinez    HTN: The patient is currently being treated for essential hypertension. This condition is chronic and stable. The patient is tolerating their medication well with good compliance.  Denies any adverse effects of medications.  Counseling was offered regarding low sodium diet.  The patient has a reduced salt intake. Routine exercise recommended. The patient denies headache, vision changes, chest pain, palpitations, shortness of breath, or lower extremity edema.    Fatigue: reports chronic fatigue. Present for months. She does reports snoring and daytime somnolence. She has not been checked for SUDARSHAN. She also reports some depressed mood and anxiety at times. On cymbalta but started for headaches. She has thought about adding wellbutrin given her smoking history with hopes to improve both mood and stop smoking. Denies weight loss or fever. Denies any other symptoms.    No other complaints today. Remaining chronic conditions have been reviewed and remain stable. Further detail as stated above.       reviewed. Due for labs and MMG.    Past Medical History:  Past Medical History:   Diagnosis Date    Acid reflux 3/1/2016    Hypertension     Kidney stones     kidney stone    Migraines     Obesity 3/1/2016    Ovarian torsion,  acquired 2019    Tobacco abuse 3/1/2016     Past Surgical History:   Procedure Laterality Date    DIAGNOSTIC LAPAROSCOPY WITH USE OF LASER N/A 2019    Procedure: LAPAROSCOPY, DIAGNOSTIC;  Surgeon: Fab Espitia MD;  Location: [facility] OR;  Service: OB/GYN;  Laterality: N/A;    HYSTERECTOMY  2014    Partial    LAPAROSCOPIC LYSIS OF ADHESIONS N/A 2019    Procedure: LYSIS, ADHESIONS, LAPAROSCOPIC;  Surgeon: Fab Espitia MD;  Location: [facility] OR;  Service: OB/GYN;  Laterality: N/A;    LAPAROSCOPIC SALPINGO-OOPHORECTOMY Bilateral 2019    Procedure: SALPINGO-OOPHORECTOMY, LAPAROSCOPIC;  Surgeon: Fab Espitia MD;  Location: [facility] OR;  Service: OB/GYN;  Laterality: Bilateral;    laproscopy      LIPOMA RESECTION      Right Inner thigh    OOPHORECTOMY Bilateral 2019     Review of patient's allergies indicates:   Allergen Reactions    Epinephrine Palpitations    Procaine Palpitations     Other reaction(s): Heart Palations, Heart Palations    Hydrochlorothiazide Anxiety     Other reaction(s): feels like she is crawling out of her skin, feels like she is crawling out of her skin, feels like she is crawling out of her skin     Social History     Tobacco Use    Smoking status: Current Every Day Smoker     Packs/day: 0.50     Years: 15.00     Pack years: 7.50     Types: Cigarettes     Start date: 1998     Last attempt to quit: 10/9/2020     Years since quittin.6    Smokeless tobacco: Never Used   Substance Use Topics    Alcohol use: Yes     Alcohol/week: 4.0 standard drinks     Types: 4 Drinks containing 0.5 oz of alcohol per week     Comment: rarely: cocktails    Drug use: No     Family History   Problem Relation Age of Onset    Leukemia Mother     Lung cancer Father     Diabetes Father     Cancer Father         Lung ca    Breast cancer Sister 41    Cancer Sister         Breast ca     Current Outpatient Medications on File Prior to Visit   Medication Sig Dispense  Refill    DULoxetine (CYMBALTA) 30 MG capsule TAKE 1 CAPSULE BY MOUTH once daily 90 capsule 1    lisinopriL 10 MG tablet TAKE 1 TABLET BY MOUTH once DAILY 90 tablet 1    TROKENDI  mg Cp24 TAKE 1 CAPSULE BY MOUTH once daily 30 capsule 2    valACYclovir (VALTREX) 500 MG tablet TAKE 1 TABLET BY MOUTH TWICE DAILY FOR 5 DAYS AS DIRECTED, needs appointment      [DISCONTINUED] cetirizine (ZYRTEC) 10 MG tablet Take 1 tablet (10 mg total) by mouth once daily. 14 tablet 0    [DISCONTINUED] dexlansoprazole (DEXILANT) 60 mg capsule Take 1 capsule (60 mg total) by mouth once daily. 30 capsule 0    [DISCONTINUED] hydrOXYzine HCL (ATARAX) 25 MG tablet Take 1 tablet (25 mg total) by mouth every evening. 14 tablet 0    [DISCONTINUED] ibuprofen (ADVIL,MOTRIN) 600 MG tablet ibuprofen 600 mg tablet      [DISCONTINUED] metaxalone (SKELAXIN) 800 MG tablet TAKE 1 TABLET BY MOUTH 3 TIMES DAILY AS NEEDED FOR PAIN (Patient not taking: Reported on 6/9/2022) 30 tablet 2    [DISCONTINUED] modafinil (PROVIGIL) 100 MG Tab Take 100 mg by mouth 2 (two) times daily.       [DISCONTINUED] nystatin-triamcinolone (MYCOLOG II) cream Apply topically 2 (two) times daily. 60 g 0     No current facility-administered medications on file prior to visit.       Review of Systems   Constitutional: Positive for fatigue. Negative for chills, diaphoresis and fever.   HENT: Negative for congestion, ear pain, postnasal drip, sinus pain and sore throat.    Eyes: Negative for pain and redness.   Respiratory: Negative for cough, chest tightness and shortness of breath.    Cardiovascular: Negative for chest pain and leg swelling.   Gastrointestinal: Negative for abdominal pain, constipation, diarrhea, nausea and vomiting.   Genitourinary: Negative for dysuria and hematuria.   Musculoskeletal: Negative for arthralgias and joint swelling.   Skin: Negative for rash.   Neurological: Positive for headaches. Negative for dizziness and syncope.       Vitals:     "06/09/22 0909   BP: 112/76   Pulse: 81   Temp: 98.3 °F (36.8 °C)   Weight: 90.8 kg (200 lb 2.8 oz)   Height: 5' 3" (1.6 m)       Wt Readings from Last 3 Encounters:   06/09/22 90.8 kg (200 lb 2.8 oz)   01/03/22 94.1 kg (207 lb 6.4 oz)   06/07/21 91.4 kg (201 lb 9.6 oz)       Physical Exam  Constitutional:       General: She is not in acute distress.     Appearance: Normal appearance. She is well-developed. She is obese.   HENT:      Head: Normocephalic and atraumatic.   Eyes:      Conjunctiva/sclera: Conjunctivae normal.   Cardiovascular:      Rate and Rhythm: Normal rate and regular rhythm.      Pulses: Normal pulses.      Heart sounds: Normal heart sounds. No murmur heard.  Pulmonary:      Effort: Pulmonary effort is normal. No respiratory distress.      Breath sounds: Normal breath sounds.   Abdominal:      General: Bowel sounds are normal. There is no distension.      Palpations: Abdomen is soft.      Tenderness: There is no abdominal tenderness.   Musculoskeletal:         General: Normal range of motion.      Cervical back: Normal range of motion and neck supple.   Skin:     General: Skin is warm and dry.      Findings: No rash.   Neurological:      General: No focal deficit present.      Mental Status: She is alert and oriented to person, place, and time.   Psychiatric:         Mood and Affect: Mood normal.         Behavior: Behavior normal.         Health Maintenance   Topic Date Due    Hepatitis C Screening  Never done    Mammogram  06/07/2022    TETANUS VACCINE  01/25/2028    Lipid Panel  Completed       "

## 2022-06-10 ENCOUNTER — TELEPHONE (OUTPATIENT)
Dept: SLEEP MEDICINE | Facility: HOSPITAL | Age: 47
End: 2022-06-10

## 2022-06-10 LAB — HCV AB SERPL QL IA: NEGATIVE

## 2022-06-10 NOTE — PROGRESS NOTES
Results have been released via [employer]. Please verify that these have been viewed by patient. If not, please call patient with results.    I have sent a message to them with the following interpretation (see below).      I have reviewed your recent blood work.     Your complete blood count is within normal limits. There was a very slight increase in your red blood cells which could be caused by untreated sleep apnea. Make sure to follow up with those sleep studies.    Your metabolic panel which shows your electrolytes, glucose, kidney and liver function is within normal limits.    Your cholesterol is slightly elevated. Your ASCVD score is 5.5%, which is considered to be borderline. This a number that is calculated using your cholesterol levels plus other risk factors and is used to estimate your risk of having a cardiovascular event (heart attack or stroke) within the next 10 years. Due to your elevated risk, you could consider starting a daily cholesterol medication called a statin, however you could also just work on healthy lifestyle choices for now, such as diet and exercise. Diet recommendations include the DASH or mediterranean diets.    Thyroid studies are within normal limits.    Vitamin D level is within normal limits. Continue current supplement dose.    Please do not hesitate to call or message with any additional questions or concerns.    Violeta Tellez MD

## 2022-06-13 ENCOUNTER — OFFICE VISIT (OUTPATIENT)
Dept: NEUROLOGY | Facility: CLINIC | Age: 47
End: 2022-06-13
Payer: COMMERCIAL

## 2022-06-13 ENCOUNTER — PATIENT MESSAGE (OUTPATIENT)
Dept: PHARMACY | Facility: CLINIC | Age: 47
End: 2022-06-13
Payer: COMMERCIAL

## 2022-06-13 VITALS
DIASTOLIC BLOOD PRESSURE: 85 MMHG | WEIGHT: 198.06 LBS | BODY MASS INDEX: 35.09 KG/M2 | SYSTOLIC BLOOD PRESSURE: 133 MMHG | RESPIRATION RATE: 17 BRPM | HEART RATE: 82 BPM | HEIGHT: 63 IN

## 2022-06-13 DIAGNOSIS — M79.18 MYOFASCIAL PAIN: ICD-10-CM

## 2022-06-13 DIAGNOSIS — G43.709 CHRONIC MIGRAINE WITHOUT AURA WITHOUT STATUS MIGRAINOSUS, NOT INTRACTABLE: Primary | ICD-10-CM

## 2022-06-13 PROCEDURE — 99205 PR OFFICE/OUTPT VISIT, NEW, LEVL V, 60-74 MIN: ICD-10-PCS | Mod: S$GLB,,, | Performed by: PHYSICIAN ASSISTANT

## 2022-06-13 PROCEDURE — 3075F SYST BP GE 130 - 139MM HG: CPT | Mod: CPTII,S$GLB,, | Performed by: PHYSICIAN ASSISTANT

## 2022-06-13 PROCEDURE — 4010F ACE/ARB THERAPY RXD/TAKEN: CPT | Mod: CPTII,S$GLB,, | Performed by: PHYSICIAN ASSISTANT

## 2022-06-13 PROCEDURE — 3008F PR BODY MASS INDEX (BMI) DOCUMENTED: ICD-10-PCS | Mod: CPTII,S$GLB,, | Performed by: PHYSICIAN ASSISTANT

## 2022-06-13 PROCEDURE — 3008F BODY MASS INDEX DOCD: CPT | Mod: CPTII,S$GLB,, | Performed by: PHYSICIAN ASSISTANT

## 2022-06-13 PROCEDURE — 1160F PR REVIEW ALL MEDS BY PRESCRIBER/CLIN PHARMACIST DOCUMENTED: ICD-10-PCS | Mod: CPTII,S$GLB,, | Performed by: PHYSICIAN ASSISTANT

## 2022-06-13 PROCEDURE — 3079F PR MOST RECENT DIASTOLIC BLOOD PRESSURE 80-89 MM HG: ICD-10-PCS | Mod: CPTII,S$GLB,, | Performed by: PHYSICIAN ASSISTANT

## 2022-06-13 PROCEDURE — 1159F PR MEDICATION LIST DOCUMENTED IN MEDICAL RECORD: ICD-10-PCS | Mod: CPTII,S$GLB,, | Performed by: PHYSICIAN ASSISTANT

## 2022-06-13 PROCEDURE — 99999 PR PBB SHADOW E&M-EST. PATIENT-LVL V: ICD-10-PCS | Mod: PBBFAC,,, | Performed by: PHYSICIAN ASSISTANT

## 2022-06-13 PROCEDURE — 99999 PR PBB SHADOW E&M-EST. PATIENT-LVL V: CPT | Mod: PBBFAC,,, | Performed by: PHYSICIAN ASSISTANT

## 2022-06-13 PROCEDURE — 4010F PR ACE/ARB THEARPY RXD/TAKEN: ICD-10-PCS | Mod: CPTII,S$GLB,, | Performed by: PHYSICIAN ASSISTANT

## 2022-06-13 PROCEDURE — 1160F RVW MEDS BY RX/DR IN RCRD: CPT | Mod: CPTII,S$GLB,, | Performed by: PHYSICIAN ASSISTANT

## 2022-06-13 PROCEDURE — 3079F DIAST BP 80-89 MM HG: CPT | Mod: CPTII,S$GLB,, | Performed by: PHYSICIAN ASSISTANT

## 2022-06-13 PROCEDURE — 99205 OFFICE O/P NEW HI 60 MIN: CPT | Mod: S$GLB,,, | Performed by: PHYSICIAN ASSISTANT

## 2022-06-13 PROCEDURE — 1159F MED LIST DOCD IN RCRD: CPT | Mod: CPTII,S$GLB,, | Performed by: PHYSICIAN ASSISTANT

## 2022-06-13 PROCEDURE — 3075F PR MOST RECENT SYSTOLIC BLOOD PRESS GE 130-139MM HG: ICD-10-PCS | Mod: CPTII,S$GLB,, | Performed by: PHYSICIAN ASSISTANT

## 2022-06-13 RX ORDER — ERGOCALCIFEROL 1.25 MG/1
CAPSULE ORAL
COMMUNITY
End: 2022-09-12

## 2022-06-13 RX ORDER — METAXALONE 800 MG/1
TABLET ORAL
COMMUNITY

## 2022-06-13 RX ORDER — GALCANEZUMAB 120 MG/ML
INJECTION, SOLUTION SUBCUTANEOUS
Qty: 2 ML | Refills: 0 | Status: SHIPPED | OUTPATIENT
Start: 2022-06-13 | End: 2022-08-08 | Stop reason: ALTCHOICE

## 2022-06-13 RX ORDER — RIZATRIPTAN BENZOATE 10 MG/1
TABLET, ORALLY DISINTEGRATING ORAL
Qty: 8 TABLET | Refills: 4 | Status: SHIPPED | OUTPATIENT
Start: 2022-06-13 | End: 2023-10-19

## 2022-06-13 RX ORDER — PREDNISONE 20 MG/1
TABLET ORAL
Qty: 12 TABLET | Refills: 0 | Status: SHIPPED | OUTPATIENT
Start: 2022-06-13 | End: 2022-08-08 | Stop reason: ALTCHOICE

## 2022-06-13 RX ORDER — GALCANEZUMAB 120 MG/ML
INJECTION, SOLUTION SUBCUTANEOUS
Qty: 1 ML | Refills: 11 | Status: SHIPPED | OUTPATIENT
Start: 2022-06-13 | End: 2023-10-19

## 2022-06-13 NOTE — PROGRESS NOTES
"  [facility]  [address] [facility] [address]  [place] [address]  Phone:[phone]  Fax: [phone]   New Patient Consultation  (previously followed by Dr Lakhani, LOV: 10/19/2017)  Patient Name: Mckenzie Barrientos  : 1975  MRN:  [id number]  Today: 2022   chief complaint: Headache    PCP: Violeta Tellez MD.    Assessment:   Mckenzie Barrientos is a 46 y.o. right handed female with a with a PMHx of: migraines, HTN,  GERD, kidney stones and tobacco use whom presents solo at the request of the PCP for HA.  HA appear to be chronic migrainous, lack of sleep (pending sleep equipment) and anxiety/depression along with myofascial pain likely contribute. Chronic use of trokendi, though known hx of nephrolithiasis. "I am willing to take the risk."       Review:    ICD-10-CM ICD-9-CM   1. Chronic migraine without aura without status migrainosus, not intractable  G43.709 346.70   2. Myofascial pain  M79.18 729.1         Plan:   Discussed realistic goals of care with patient at length. Discussed medication options, need for lifestyle adjustment. Discussed treatment will take time. Goal will be to reduce frequency/intensity/quantity of HA, not to be completely HA free. Gave copy of S triggers for migraine informational sheet, and discussed clinic's non narcotic policy re: HA. Patient voiced understanding and agreement.            -will have patient track HA, discussed kishan for smart phone           -will have patient work on lifestyle           -if HA change in quality/nature, will get updated imaging study           -discussed potential for teratogenicity with treatment-has had partial hysterectomy     For HA Prevention:  1 discussed botox vs cGRP, chose cGRP, wrote for emgality, discussed adv effects/dosing, had neuro LPN give teaching, all questions answered  2 trokendi for now, ideally off this medicine  3 mood medicines per other providers  4 PT ordered  5 Consider the cefaly " "device, [email or web address], please research, this is TENs unit designed in [place] and was FDA approved in the early s for migraines.       For HA :  Limit OTC to <3 days use in week  maxalt written, discussed adv effects/dosing, she agreed    To break up Headaches:  Course of steroids written to break up HA    Other:  We discussed tobacco cessation           All test results as well as any necessary instructions were reviewed and discussed with patient.    Review:  Orders Placed This Encounter    Ambulatory referral/consult to Physical/Occupational Therapy    predniSONE (DELTASONE) 20 MG tablet    rizatriptan (MAXALT-MLT) 10 MG disintegrating tablet    galcanezumab-gnlm (EMGALITY PEN) 120 mg/mL PnIj    galcanezumab-gnlm (EMGALITY PEN) 120 mg/mL PnIj         Patient to return to PCP/other specialists for all other problems  Patient to continue on all medications as Rx'd   A detailed AVS was provided to the patient with patient readback   RTO- 2-3 months, to review HA diary   The patient indicates understanding of these issues and agrees to the plan.    HPI:   Mckenzie Barrientos is a 46 y.o.right handed, female with a PMHx of: migraines, HTN,  GERD, kidney stones and tobacco use whom presents solo at the request of the PCP for HA.        HA HPI:  Start:HA since age 13 ("hormonal related"---otc and dark room HA went away), mid 20s became more frequent/stronger (numbness occurring as well)  History of trauma (5 years old fell off a horse), History of CNS infection (no), History of Stroke (no)  Location:right parietal and bitemporal, radiation to frontalis   Severity: range: 4-8/10  Duration:all day   Frequency:30 HD/30  Associated factors (bolded positive) WITH HA  or migraine): Nausea, vomiting, photophobia, phonophobia, tinnitus, scalp pain, vision loss, diplopia, blurred vision, scintillations, eye pain, jaw pain, weakness?    Tried:trokendi and cymbalta, will occasionally try OTC   Triggers (in " "bold): stress, lack of sleep, too much caffeine, too little caffeine, weather change, seasonal change, strong odours, bright lights, sunlight, food   Currently having a HA?:yes   Positives in bold: Hx of Kidney Stones, asthma, GI bleed, osteoporosis, CAD/MI, CVA/TIA, DM    Imaging on file: not in [facility] E[facility]   Therapies tried in past: (failures to be marked, if known---why did it fail?)  trokendi  Lisinopril  cymbalta  wellbutrin  fioricet  toradol  Inderal  viibryd  zomig  botox -expensive, did years ago   migranal  relpax  depakote  Diclofenac  ASA  Rizatriptan  maxalt  imitrex  Dilantin  bendaryl      From Dr. Block's notes (6/22/2017):  "    HPI   The patient is a pleasant 41 y/o female who presents to clinic to establish care after former Neurologist Dr. Zelalem Casey"s passing.   She had had migraines since the age of 13 that corresponded with her menstrual cycle.  They were regularly treated with aspirin with little problem.  At age 30 her migraines changed in frequency and intensity.  They became localized to either the R or L side or globally.  She describes her typical migraine as a throbbing pain that can be as intense as 10/10.  They are associated w/ dizziness, falls, blurred vision w/ loss of visual acuity. Previously has had Auras that look like halos around objects before they occur. Currently she is complaining of daily headaches that are on average 2/10 that intensify throughout the day that can progress to tension headache or a migraine.  Her current headaches get better with pressure and ice. Got "DOTH" piercing which she said helped her headaches for a short time 8 months ago. She has a Toradol injection that she can take if they progress for longer than 2 days.  Takes topomax 50mg daily.  Previously had done botox with Dr. Casey last August and is interested in getting more botox injections.  Has done lifestyle modification with WL and reducing stress which have helped.  Please see details " of headache characteristics headache below.  Headache questionnaire     1. When did your Headaches start?          Began at age 13 and worsened at age [age]        2. Where are your headaches located?                        Primarily right frontal /temporal but also bilat and diffuse          3. Your headache's characteristics:                        Excruciating, Pressure, Throbbing, Pounding, Like a tight band,         4. How long does the headache last?                        Hours, days         5. How often does the headache occur?                        daily        6. Are your headaches preceded or accompanied by other symptoms? yes                        If yes, please describe.  Visual disturbance/ blurred, nausea , dizziness         7. Does the headache awaken you at night? yes                        If so, how often? 3-4 times per month            8. Please yodit the word that best describes your headache's intensity:                         severe        9. Using a scale of 1 through 10, with 0 = no pain and 10 = the worst pain:                        What score is your headache now? 3                        What score is your headache at its worst? 8                        What score is your headache at its best? 3           10. Possible associated headache symptoms:  [x]?  Sensitivity to light                                      [x]? Dizziness                                        []? Nasal or sinus pressure/ pain                        [x]? Sensitivity to noise                                     [x]? Vertigo                                            [x]? Problems with concentration  [x]? Sensitivity to smells             []? Ringing in ears                     [x]? Problems with memory                                            [x]? Blurred vision                                 [x]? Irritability                                         [x]? Problems with task completion   []? Double  vision                                 [x]? Anger                                  [x]?  Problems with relaxation  []? Loss of appetite                                         []? Anxiety                                           [x]? Neck tightness, Neck pain  [x]? Nausea                                                                 []? Nasal congestion  [x]? Vomiting                                                                       11. Headache improving factors:  []? Sleep                                  []? Heat  [x]? Darkness                                        [x]? Ice  [x]? Local pressure                     []? Menses (period)  []? Massage                                        [x]? Medications:          12. Headache worsening factors:   []? Fatigue                     []? Sneezing                                        [x]? Changes in Weather  [x]? Light             []? Bending Over           []? Stress  [x]? Noise            []? Ovulation                                        []? Multiple Sclerosis Flare-Up  [x]? Smells                      []? Menses                                          []? Food   []? Coughing                  []? Alcohol        13. Number of caffeinated drinks per day: 1        14. Number of diet drinks per day:  0            AED Neuromodulators   MAOIs   Ergot Alkaloids     Acetazolamide (Diamox) []?  Phenelzine (Nardil) []?  Dihydroergotamine (Migranal) [x]?    Carbamazepine (Tegretol) []?  Tranylcypromine (Parnate) []?  Ergotamine (Ergomar) []?    Gabapentin (Neurontin) []?  Antihistamine/Serotonergic   Triptans     Lacosamide (Vimpat) []?  Cyproheptadine (Periactin) []?  Almotriptan (Axert) []?    Lamotrigine (Lamictal) []?  Antihypertensives   Eletriptan (Relpax) [x]?    Levatiracetam (Keppra) []?  Atenolol (Tenormin) []?  Frovatriptan (Frova) []?    Oxcarbazepine (Trileptal) []?  Bisoprostol (Zebeta) []?  Naratriptan (Amerge) []?    Phenobarbital []?  Candesartan  "(Atacand) []?  Rizatriptan (Maxalt) [x]?    Phenytoin (Dilantin) [x]?  Diltiazem (Cardizem) []?  Sumatriptan (Imitrex) [x]?    Pregabalin (Lyrica) []?  Lisinopril (Prinivil, Zestril) [x]?  Zolmitriptan (Zomig) [x]?    Primidone (Mysoline) []?  Metoprolol (Toprol) [x]?  Combo Abortives     Tiagabine (Gabatril) []?  Nadolol (Corgard) []?  Butalbital and Acetaminophen (Bupap) []?    Topiramate (Topamax)  (Trokendi) [x]?  Nicardipine (Cardene) []?      Vigabatrin (Sabril) []?  Nimodipine (Nimotop) []?  Butalbital, Acetaminophen, and caffeine (Fioricet) [x]?    Valproic Acid (Depakote) (Divalproex Sodium) [x]?  Propranolol (Inderal) [x]?      Zonisamide (Zonegran) []?  Telmisartan (Micardis) []?  Butalbital, Aspirin, and caffeine (Fiorinal) [x]?    Benzodiazepines   Timolol (Blocadren) []?      Alprazolam (Xanax) []?  Verapamil (Calan, Verelan) []?  Butalbital, Caffeine, Acetaminophen, and Codeine (Fioricet with Codeine) []?    Diazepam (Valium) []?  NSAIDs       Lorazepam (Ativan) []?  Acetaminophen (Tylenol) [x]?      Clonazepam (Klonopin) []?  Acetylsalicylic Acid (Aspirin) [x]?  Butalbital, Caffeine, Aspirin, and Codeine  (Fiorinal with Codeine) []?    Antidepressants   Diclofenac (Cambia) [x]?      Amitriptyline (Elavil) []?  Ibuprofen (Motrin) [x]?      Desipramine (Norpramin) []?  Indomethacin (Indocin) []?  Aspirin, Caffeine, and Acetaminophen (Excedrin) (Goodys) [x]?    "            Medication Reconciliation:   Current Outpatient Medications   Medication Sig Dispense Refill    buPROPion (WELLBUTRIN SR) 150 MG TBSR 12 hr tablet Take 1 tablet (150 mg total) by mouth 2 (two) times daily. 60 tablet 0    dexlansoprazole (DEXILANT) 60 mg capsule Take 1 capsule (60 mg total) by mouth once daily. 30 capsule 5    DULoxetine (CYMBALTA) 30 MG capsule TAKE 1 CAPSULE BY MOUTH once daily 90 capsule 1    lisinopriL 10 MG tablet TAKE 1 TABLET BY MOUTH once DAILY 90 tablet 1    TROKENDI  mg Cp24 TAKE 1 CAPSULE BY " MOUTH once daily 30 capsule 2    valACYclovir (VALTREX) 500 MG tablet TAKE 1 TABLET BY MOUTH TWICE DAILY FOR 5 DAYS AS DIRECTED, needs appointment       No current facility-administered medications for this visit.     Review of patient's allergies indicates:   Allergen Reactions    Epinephrine Palpitations    Procaine Palpitations     Other reaction(s): Heart Palations, Heart Palations    Hydrochlorothiazide Anxiety     Other reaction(s): feels like she is crawling out of her skin, feels like she is crawling out of her skin, feels like she is crawling out of her skin       PMHx:  Past Medical History:   Diagnosis Date    Acid reflux 3/1/2016    Hypertension     Kidney stones     kidney stone    Migraines     Obesity 3/1/2016    Ovarian torsion, acquired 9/27/2019    Tobacco abuse 3/1/2016     Past Surgical History:   Procedure Laterality Date    DIAGNOSTIC LAPAROSCOPY WITH USE OF LASER N/A 09/27/2019    Procedure: LAPAROSCOPY, DIAGNOSTIC;  Surgeon: Fab Espitia MD;  Location: [facility];  Service: OB/GYN;  Laterality: N/A;    HYSTERECTOMY  2014    Partial    LAPAROSCOPIC LYSIS OF ADHESIONS N/A 09/27/2019    Procedure: LYSIS, ADHESIONS, LAPAROSCOPIC;  Surgeon: Fab Espitia MD;  Location: [facility] OR;  Service: OB/GYN;  Laterality: N/A;    LAPAROSCOPIC SALPINGO-OOPHORECTOMY Bilateral 09/27/2019    Procedure: SALPINGO-OOPHORECTOMY, LAPAROSCOPIC;  Surgeon: Fab Espitia MD;  Location: [facility] OR;  Service: OB/GYN;  Laterality: Bilateral;    laproscopy      LIPOMA RESECTION      Right Inner thigh    OOPHORECTOMY Bilateral 2019       Fhx:  Family History   Problem Relation Age of Onset    Leukemia Mother     Lung cancer Father     Diabetes Father     Cancer Father         Lung ca    Breast cancer Sister 41    Cancer Sister         Breast ca       Shx: 1 pack per day tobacco   Social History     Socioeconomic History    Marital status:    Tobacco Use    Smoking status: Current  Every Day Smoker     Packs/day: 0.50     Years: 15.00     Pack years: 7.50     Types: Cigarettes     Start date: 1998     Last attempt to quit: 10/9/2020     Years since quittin.6    Smokeless tobacco: Never Used   Substance and Sexual Activity    Alcohol use: Yes     Alcohol/week: 4.0 standard drinks     Types: 4 Drinks containing 0.5 oz of alcohol per week     Comment: rarely: cocktails    Drug use: No    Sexual activity: Yes     Partners: Male     Birth control/protection: See Surgical Hx     Social Determinants of Health     Financial Resource Strain: Low Risk     Difficulty of Paying Living Expenses: Not very hard   Food Insecurity: No Food Insecurity    Worried About Running Out of Food in the Last Year: Never true    Ran Out of Food in the Last Year: Never true   Transportation Needs: No Transportation Needs    Lack of Transportation (Medical): No    Lack of Transportation (Non-Medical): No   Physical Activity: Insufficiently Active    Days of Exercise per Week: 2 days    Minutes of Exercise per Session: 20 min   Stress: Stress Concern Present    Feeling of Stress : To some extent   Social Connections: Unknown    Frequency of Communication with Friends and Family: More than three times a week    Frequency of Social Gatherings with Friends and Family: Twice a week    Active Member of Clubs or Organizations: No    Attends Club or Organization Meetings: Patient refused    Marital Status:    Housing Stability: Low Risk     Unable to Pay for Housing in the Last Year: No    Number of Places Lived in the Last Year: 2    Unstable Housing in the Last Year: No           Labs:   Reviewed in chart     Imaging:   Reviewed in chart       Other testing:  Reviewed in chart     Note: I have independently reviewed any/all imaging/labs/tests and agree with the report (s) as documented.  Any discrepancies will be as noted/demarcated by free text.  SETH TANG 2022                     ROS:  "  Review Of Systems (questions asked, positive or additions in BOLD)  Gen: Weight change, fatigue/malaise, pyrexia   HEENT: Tinnitus, headache,  blurred vision, eye pain, diplopia, photophobia,  nose bleeds, congestion, sore throat, jaw pain, scalp pain, neck stiffness   Card: Palpitations, CP   Pulm: SOB, cough   Vas: Easy bruising, easy bleeding   GI: N/V/D/C, incontinence, hematemesis, hematochezia    : incontinence, hematuria   Endocrine: Temp intolerance, polyuria, polydipsia   M/S: Neck pain, myalgia, back pain, joint pain, falls    Neuro: PER HPI   PSY: Memory loss, confusion, depression, anxiety, trouble in sleep          EXAM:   /85 (BP Location: Right arm, Patient Position: Sitting, BP Method: Medium (Automatic))   Pulse 82   Resp 17   Ht 5' 3" (1.6 m)   Wt 89.8 kg (198 lb 1.3 oz)   BMI 35.09 kg/m²    GEN:  NAD  HEENT: NC/AT, Frontalis was NTTP, temporalis was NTTP, vertex NTTP,  nares patent, dentition appropriate, neck supple, trachea midline, Occiput and trapezius  TTP, sits with head forward posture   CVS: RRR, no MRG  LUNGS: CTAB, no accessory muscles used for respiration   EXTREM:  2+ radial pulse bilat, no edema present.    NEURO:  Mental Status:  Awake, alert and appropriately oriented to time, place, and person.  Normal attention and concentration.  Speech is fluent and appropriate language function (I.e., comprehension, repetition, etc).     Cranial Nerves:   Extraocular movements are intact and without nystagmus.  Visual fields are full to confrontation testing. Colour vision change not found.  Facial movement is symmetric.  Facial sensation is intact.  Hearing is normal. Uvula in midline. DROM of neck in all (6) directions, shoulder shrug symmetrical. Tongue in midline without fasiculation.     Motor:  RUE:appropriate against gravity and medium force as tested 5/5              LUE: appropriate against gravity and medium force as tested 5/5              RLE:appropriate against " gravity and medium force as tested 5/5              LLE: appropriate against gravity and medium force as tested 5/5  Tremor/pronator drift not apparent.     finger extension strength was strong bilat     Sensory:  RUE  intact light touch, proprioception and temperature  LUE intact light touch, proprioception and temperature    RLE intact light touch  LLE intact light touch      DTR's:                                            R              L  biceps 1+ 1+         brachioradialis 1+ 1+   Knee jerk 1+ 1+        Coordination:  FTN-WNL.     Gait and Stance: Normal manner of stance and gait function testing. Romberg was negative          This document has been electronically signed by  Kameron BEARDENYelitza Zavala MPA, PA-C on 6/13/2022, I have personally typed this message using the EMR.       Dr Umair MD was available during today's visit.     Personal Protective Equipment:    Personal Protective Equipment was used during this encounter including: KN95 and non latex gloves.          CC: Violeta Tellez MD

## 2022-06-13 NOTE — PATIENT INSTRUCTIONS
"     To reduce headaches:  1)Try the emgality injections, neuro nurse will show to you   2)Consider the cefaly device, www.[employer], please research, this is TENs unit designed in [place] and was FDA approved in the early 2010s for migraines.   3) PT ordered  4) in future, would like to get you off the trokendi       To abort headaches:  Try the maxalt (rizatriptan) melt at onset headache, second melt 2 hours later if needed, no more than 2 melts day/3 days use in week       To "factory reset":  Try the deltasone (prednisone), on full stomach at breakfast time only. 3 pills for 2 days days, 2 pills for 2days then 1 pill for 2 days, then off     Please track your headaches: consider [employer] Mobile Apps free kishan for [employer]   "

## 2022-06-14 ENCOUNTER — TELEPHONE (OUTPATIENT)
Dept: PHARMACY | Facility: CLINIC | Age: 47
End: 2022-06-14
Payer: COMMERCIAL

## 2022-06-14 NOTE — TELEPHONE ENCOUNTER
[facility] prescription has been APPROVED FROM 6/13/2022 TO 6/13/2023 with copayment of $30.       [facility] [place] @[phone] will reach out to patient for further correspondence.    Detail Level: Zone Isotretinoin Counseling: Patient should get monthly blood tests, not donate blood, not drive at night if vision affected, not share medication, and not undergo elective surgery for 6 months after tx completed. Side effects reviewed, pt to contact office should one occur. Tetracycline Counseling: Patient counseled regarding possible photosensitivity and increased risk for sunburn. Patient instructed to avoid sunlight, if possible. When exposed to sunlight, patients should wear protective clothing, sunglasses, and sunscreen. The patient was instructed to call the office immediately if the following severe adverse effects occur:  hearing changes, easy bruising/bleeding, severe headache, or vision changes. The patient verbalized understanding of the proper use and possible adverse effects of tetracycline. All of the patient's questions and concerns were addressed. Patient understands to avoid pregnancy while on therapy due to potential birth defects. Minocycline Counseling: Patient advised regarding possible photosensitivity and discoloration of the teeth, skin, lips, tongue and gums. Patient instructed to avoid sunlight, if possible. When exposed to sunlight, patients should wear protective clothing, sunglasses, and sunscreen. The patient was instructed to call the office immediately if the following severe adverse effects occur:  hearing changes, easy bruising/bleeding, severe headache, or vision changes. The patient verbalized understanding of the proper use and possible adverse effects of minocycline. All of the patient's questions and concerns were addressed. Azithromycin Counseling:  I discussed with the patient the risks of azithromycin including but not limited to GI upset, allergic reaction, drug rash, diarrhea, and yeast infections. Doxycycline Counseling:  Patient counseled regarding possible photosensitivity and increased risk for sunburn. Patient instructed to avoid sunlight, if possible. When exposed to sunlight, patients should wear protective clothing, sunglasses, and sunscreen. The patient was instructed to call the office immediately if the following severe adverse effects occur:  hearing changes, easy bruising/bleeding, severe headache, or vision changes. The patient verbalized understanding of the proper use and possible adverse effects of doxycycline. All of the patient's questions and concerns were addressed. Topical Clindamycin Counseling: Patient counseled that this medication may cause skin irritation or allergic reactions. In the event of skin irritation, the patient was advised to reduce the amount of the drug applied or use it less frequently. The patient verbalized understanding of the proper use and possible adverse effects of clindamycin. All of the patient's questions and concerns were addressed. Detail Level: Detailed Topical Retinoid counseling:  Patient advised to apply a pea-sized amount only at bedtime and wait 30 minutes after washing their face before applying. If too drying, patient may add a non-comedogenic moisturizer. The patient verbalized understanding of the proper use and possible adverse effects of retinoids. All of the patient's questions and concerns were addressed. Birth Control Pills Counseling: Birth Control Pill Counseling: I discussed with the patient the potential side effects of OCPs including but not limited to increased risk of stroke, heart attack, thrombophlebitis, deep venous thrombosis, hepatic adenomas, breast changes, GI upset, headaches, and depression. The patient verbalized understanding of the proper use and possible adverse effects of OCPs. All of the patient's questions and concerns were addressed. Include Pregnancy/Lactation Warning?: No Birth Control Pills Pregnancy And Lactation Text: This medication should be avoided if pregnant and for the first 30 days post-partum. Tetracycline Pregnancy And Lactation Text: This medication is Pregnancy Category D and not consider safe during pregnancy. It is also excreted in breast milk. Doxycycline Pregnancy And Lactation Text: This medication is Pregnancy Category D and not consider safe during pregnancy. It is also excreted in breast milk but is considered safe for shorter treatment courses. Topical Retinoid Pregnancy And Lactation Text: This medication is Pregnancy Category C. It is unknown if this medication is excreted in breast milk. Azithromycin Pregnancy And Lactation Text: This medication is considered safe during pregnancy and is also secreted in breast milk. Isotretinoin Pregnancy And Lactation Text: This medication is Pregnancy Category X and is considered extremely dangerous during pregnancy. It is unknown if it is excreted in breast milk. Topical Clindamycin Pregnancy And Lactation Text: This medication is Pregnancy Category B and is considered safe during pregnancy. It is unknown if it is excreted in breast milk. High Dose Vitamin A Counseling: Side effects reviewed, pt to contact office should one occur. Erythromycin Counseling:  I discussed with the patient the risks of erythromycin including but not limited to GI upset, allergic reaction, drug rash, diarrhea, increase in liver enzymes, and yeast infections. WDL Bactrim Counseling:  I discussed with the patient the risks of sulfa antibiotics including but not limited to GI upset, allergic reaction, drug rash, diarrhea, dizziness, photosensitivity, and yeast infections. Rarely, more serious reactions can occur including but not limited to aplastic anemia, agranulocytosis, methemoglobinemia, blood dyscrasias, liver or kidney failure, lung infiltrates or desquamative/blistering drug rashes. Spironolactone Counseling: Patient advised regarding risks of diarrhea, abdominal pain, hyperkalemia, birth defects (for female patients), liver toxicity and renal toxicity. The patient may need blood work to monitor liver and kidney function and potassium levels while on therapy. The patient verbalized understanding of the proper use and possible adverse effects of spironolactone. All of the patient's questions and concerns were addressed. Topical Sulfur Applications Counseling: Topical Sulfur Counseling: Patient counseled that this medication may cause skin irritation or allergic reactions. In the event of skin irritation, the patient was advised to reduce the amount of the drug applied or use it less frequently. The patient verbalized understanding of the proper use and possible adverse effects of topical sulfur application. All of the patient's questions and concerns were addressed. Dapsone Counseling: I discussed with the patient the risks of dapsone including but not limited to hemolytic anemia, agranulocytosis, rashes, methemoglobinemia, kidney failure, peripheral neuropathy, headaches, GI upset, and liver toxicity. Patients who start dapsone require monitoring including baseline LFTs and weekly CBCs for the first month, then every month thereafter. The patient verbalized understanding of the proper use and possible adverse effects of dapsone. All of the patient's questions and concerns were addressed. Benzoyl Peroxide Counseling: Patient counseled that medicine may cause skin irritation and bleach clothing. In the event of skin irritation, the patient was advised to reduce the amount of the drug applied or use it less frequently. The patient verbalized understanding of the proper use and possible adverse effects of benzoyl peroxide. All of the patient's questions and concerns were addressed. Tazorac Counseling:  Patient advised that medication is irritating and drying. Patient may need to apply sparingly and wash off after an hour before eventually leaving it on overnight. The patient verbalized understanding of the proper use and possible adverse effects of tazorac. All of the patient's questions and concerns were addressed. Benzoyl Peroxide Pregnancy And Lactation Text: This medication is Pregnancy Category C. It is unknown if benzoyl peroxide is excreted in breast milk. Spironolactone Pregnancy And Lactation Text: This medication can cause feminization of the male fetus and should be avoided during pregnancy. The active metabolite is also found in breast milk. Bactrim Pregnancy And Lactation Text: This medication is Pregnancy Category D and is known to cause fetal risk. It is also excreted in breast milk. Erythromycin Pregnancy And Lactation Text: This medication is Pregnancy Category B and is considered safe during pregnancy. It is also excreted in breast milk. Dapsone Pregnancy And Lactation Text: This medication is Pregnancy Category C and is not considered safe during pregnancy or breast feeding. High Dose Vitamin A Pregnancy And Lactation Text: High dose vitamin A therapy is contraindicated during pregnancy and breast feeding. Topical Sulfur Applications Pregnancy And Lactation Text: This medication is Pregnancy Category C and has an unknown safety profile during pregnancy. It is unknown if this topical medication is excreted in breast milk. Tazorac Pregnancy And Lactation Text: This medication is not safe during pregnancy. It is unknown if this medication is excreted in breast milk. Doxycycline Counseling:  Patient counseled regarding possible photosensitivity and increased risk for sunburn.  Patient instructed to avoid sunlight, if possible.  When exposed to sunlight, patients should wear protective clothing, sunglasses, and sunscreen.  The patient was instructed to call the office immediately if the following severe adverse effects occur:  hearing changes, easy bruising/bleeding, severe headache, or vision changes.  The patient verbalized understanding of the proper use and possible adverse effects of doxycycline.  All of the patient's questions and concerns were addressed. Patient Specific Counseling (Will Not Stick From Patient To Patient): Begin dermal infusions Minocycline Counseling: Patient advised regarding possible photosensitivity and discoloration of the teeth, skin, lips, tongue and gums.  Patient instructed to avoid sunlight, if possible.  When exposed to sunlight, patients should wear protective clothing, sunglasses, and sunscreen.  The patient was instructed to call the office immediately if the following severe adverse effects occur:  hearing changes, easy bruising/bleeding, severe headache, or vision changes.  The patient verbalized understanding of the proper use and possible adverse effects of minocycline.  All of the patient's questions and concerns were addressed. Topical Clindamycin Counseling: Patient counseled that this medication may cause skin irritation or allergic reactions.  In the event of skin irritation, the patient was advised to reduce the amount of the drug applied or use it less frequently.   The patient verbalized understanding of the proper use and possible adverse effects of clindamycin.  All of the patient's questions and concerns were addressed. Tetracycline Counseling: Patient counseled regarding possible photosensitivity and increased risk for sunburn.  Patient instructed to avoid sunlight, if possible.  When exposed to sunlight, patients should wear protective clothing, sunglasses, and sunscreen.  The patient was instructed to call the office immediately if the following severe adverse effects occur:  hearing changes, easy bruising/bleeding, severe headache, or vision changes.  The patient verbalized understanding of the proper use and possible adverse effects of tetracycline.  All of the patient's questions and concerns were addressed. Patient understands to avoid pregnancy while on therapy due to potential birth defects. Topical Retinoid counseling:  Patient advised to apply a pea-sized amount only at bedtime and wait 30 minutes after washing their face before applying.  If too drying, patient may add a non-comedogenic moisturizer. The patient verbalized understanding of the proper use and possible adverse effects of retinoids.  All of the patient's questions and concerns were addressed. Bactrim Counseling:  I discussed with the patient the risks of sulfa antibiotics including but not limited to GI upset, allergic reaction, drug rash, diarrhea, dizziness, photosensitivity, and yeast infections.  Rarely, more serious reactions can occur including but not limited to aplastic anemia, agranulocytosis, methemoglobinemia, blood dyscrasias, liver or kidney failure, lung infiltrates or desquamative/blistering drug rashes. Dapsone Counseling: I discussed with the patient the risks of dapsone including but not limited to hemolytic anemia, agranulocytosis, rashes, methemoglobinemia, kidney failure, peripheral neuropathy, headaches, GI upset, and liver toxicity.  Patients who start dapsone require monitoring including baseline LFTs and weekly CBCs for the first month, then every month thereafter.  The patient verbalized understanding of the proper use and possible adverse effects of dapsone.  All of the patient's questions and concerns were addressed. Spironolactone Counseling: Patient advised regarding risks of diarrhea, abdominal pain, hyperkalemia, birth defects (for female patients), liver toxicity and renal toxicity. The patient may need blood work to monitor liver and kidney function and potassium levels while on therapy. The patient verbalized understanding of the proper use and possible adverse effects of spironolactone.  All of the patient's questions and concerns were addressed. Tazorac Counseling:  Patient advised that medication is irritating and drying.  Patient may need to apply sparingly and wash off after an hour before eventually leaving it on overnight.  The patient verbalized understanding of the proper use and possible adverse effects of tazorac.  All of the patient's questions and concerns were addressed. Topical Sulfur Applications Counseling: Topical Sulfur Counseling: Patient counseled that this medication may cause skin irritation or allergic reactions.  In the event of skin irritation, the patient was advised to reduce the amount of the drug applied or use it less frequently.   The patient verbalized understanding of the proper use and possible adverse effects of topical sulfur application.  All of the patient's questions and concerns were addressed. Benzoyl Peroxide Counseling: Patient counseled that medicine may cause skin irritation and bleach clothing.  In the event of skin irritation, the patient was advised to reduce the amount of the drug applied or use it less frequently.   The patient verbalized understanding of the proper use and possible adverse effects of benzoyl peroxide.  All of the patient's questions and concerns were addressed. Bactrim Pregnancy And Lactation Text: This medication is Pregnancy Category D and is known to cause fetal risk.  It is also excreted in breast milk. Birth Control Pills Counseling: Birth Control Pill Counseling: I discussed with the patient the potential side effects of OCPs including but not limited to increased risk of stroke, heart attack, thrombophlebitis, deep venous thrombosis, hepatic adenomas, breast changes, GI upset, headaches, and depression.  The patient verbalized understanding of the proper use and possible adverse effects of OCPs. All of the patient's questions and concerns were addressed.

## 2022-06-19 ENCOUNTER — PATIENT MESSAGE (OUTPATIENT)
Dept: NEUROLOGY | Facility: CLINIC | Age: 47
End: 2022-06-19
Payer: COMMERCIAL

## 2022-06-22 ENCOUNTER — PROCEDURE VISIT (OUTPATIENT)
Dept: SLEEP MEDICINE | Facility: CLINIC | Age: 47
End: 2022-06-22
Payer: COMMERCIAL

## 2022-06-22 DIAGNOSIS — R06.83 SNORING: ICD-10-CM

## 2022-06-22 DIAGNOSIS — R40.0 DAYTIME SOMNOLENCE: ICD-10-CM

## 2022-06-22 PROCEDURE — 95800 SLP STDY UNATTENDED: CPT

## 2022-06-22 NOTE — Clinical Note
2 night study MODERATE OBSTRUCTIVE SLEEP APNEA with overall AHI 15.9/hr ( 127 events): night #1 Oxygen desaturation: 88%. SpO2 between 90% to 94% for 3 hr 23 min. Patient snored 100 % time above 50 . Heart rate range: 63 bpm - 94 bpm REC's: Please refer to sleep disorders clinic Therapy with APAP at 4-15 cm WP using mask of choice with heated humidification is an option. Weight loss/management. with regular exercise per direction of physician. Avoid drowsy driving. Follow up in sleep clinic to maximize adherence and ensure resolution of symptoms.

## 2022-06-24 ENCOUNTER — PATIENT MESSAGE (OUTPATIENT)
Dept: PULMONOLOGY | Facility: CLINIC | Age: 47
End: 2022-06-24
Payer: COMMERCIAL

## 2022-06-24 DIAGNOSIS — G47.33 OBSTRUCTIVE SLEEP APNEA: Primary | ICD-10-CM

## 2022-06-24 PROCEDURE — 95806 PR SLEEP STUDY, UNATTENDED, SIMUL RECORD HR/O2 SAT/RESP FLOW/RESP EFFT: ICD-10-PCS | Mod: 26,S$GLB,, | Performed by: INTERNAL MEDICINE

## 2022-06-24 PROCEDURE — 95806 SLEEP STUDY UNATT&RESP EFFT: CPT | Mod: 26,S$GLB,, | Performed by: INTERNAL MEDICINE

## 2022-06-24 NOTE — PROCEDURES
Home Sleep Studies    Date/Time: 6/22/2022 8:00 AM  Performed by: Jamie Mendoza MD  Authorized by: Violeta Tellez MD       2 night study  MODERATE OBSTRUCTIVE SLEEP APNEA with overall AHI 15.9/hr ( 127 events): night #1  Oxygen desaturation: 88%. SpO2 between 90% to 94% for 3 hr 23 min.  Patient snored 100 % time above 50 .  Heart rate range: 63 bpm - 94 bpm  REC's:  Please refer to sleep disorders clinic  Therapy with APAP at 4-15 cm WP using mask of choice with heated humidification is an option.  Weight loss/management. with regular exercise per direction of physician.  Avoid drowsy driving.  Follow up in sleep clinic to maximize adherence and ensure resolution of symptoms.

## 2022-06-24 NOTE — PROGRESS NOTES
Results have been released via [employer]. Please verify that these have been viewed by patient. If not, please call patient with results.     Please schedule the following orders: sleep medicine    I have sent a message to them with the following interpretation (see below).    I have reviewed your recent sleep study which showed moderate obstructive sleep apnea. Because of this, I have placed a referral to sleep medicine.     Please do not hesitate to call or message with any additional questions or concerns.    Olga Curry PA-C

## 2022-06-28 ENCOUNTER — OFFICE VISIT (OUTPATIENT)
Dept: PULMONOLOGY | Facility: CLINIC | Age: 47
End: 2022-06-28
Payer: COMMERCIAL

## 2022-06-28 VITALS — BODY MASS INDEX: 35.08 KG/M2 | WEIGHT: 198 LBS | HEIGHT: 63 IN

## 2022-06-28 DIAGNOSIS — F17.200 TOBACCO DEPENDENCE: ICD-10-CM

## 2022-06-28 DIAGNOSIS — G47.20 CIRCADIAN RHYTHM DISORDER: ICD-10-CM

## 2022-06-28 DIAGNOSIS — G47.33 OBSTRUCTIVE SLEEP APNEA: Primary | ICD-10-CM

## 2022-06-28 DIAGNOSIS — E66.01 SEVERE OBESITY (BMI 35.0-39.9) WITH COMORBIDITY: ICD-10-CM

## 2022-06-28 DIAGNOSIS — I10 PRIMARY HYPERTENSION: ICD-10-CM

## 2022-06-28 PROCEDURE — 3008F BODY MASS INDEX DOCD: CPT | Mod: CPTII,95,, | Performed by: PHYSICIAN ASSISTANT

## 2022-06-28 PROCEDURE — 99204 PR OFFICE/OUTPT VISIT, NEW, LEVL IV, 45-59 MIN: ICD-10-PCS | Mod: 95,,, | Performed by: PHYSICIAN ASSISTANT

## 2022-06-28 PROCEDURE — 1159F MED LIST DOCD IN RCRD: CPT | Mod: CPTII,95,, | Performed by: PHYSICIAN ASSISTANT

## 2022-06-28 PROCEDURE — 1160F PR REVIEW ALL MEDS BY PRESCRIBER/CLIN PHARMACIST DOCUMENTED: ICD-10-PCS | Mod: CPTII,95,, | Performed by: PHYSICIAN ASSISTANT

## 2022-06-28 PROCEDURE — 4010F ACE/ARB THERAPY RXD/TAKEN: CPT | Mod: CPTII,95,, | Performed by: PHYSICIAN ASSISTANT

## 2022-06-28 PROCEDURE — 1160F RVW MEDS BY RX/DR IN RCRD: CPT | Mod: CPTII,95,, | Performed by: PHYSICIAN ASSISTANT

## 2022-06-28 PROCEDURE — 99204 OFFICE O/P NEW MOD 45 MIN: CPT | Mod: 95,,, | Performed by: PHYSICIAN ASSISTANT

## 2022-06-28 PROCEDURE — 4010F PR ACE/ARB THEARPY RXD/TAKEN: ICD-10-PCS | Mod: CPTII,95,, | Performed by: PHYSICIAN ASSISTANT

## 2022-06-28 PROCEDURE — 1159F PR MEDICATION LIST DOCUMENTED IN MEDICAL RECORD: ICD-10-PCS | Mod: CPTII,95,, | Performed by: PHYSICIAN ASSISTANT

## 2022-06-28 PROCEDURE — 3008F PR BODY MASS INDEX (BMI) DOCUMENTED: ICD-10-PCS | Mod: CPTII,95,, | Performed by: PHYSICIAN ASSISTANT

## 2022-06-28 NOTE — ASSESSMENT & PLAN NOTE
Moderate SUDARSHAN  AutoPAP, nasal pillow ordered  Discussed therapeutic goals for CPAP: Ideal usage 100% of nights for 6-8 hours per night. Minimum usage is 70% of night for at least 4 hours per night.

## 2022-06-28 NOTE — PROGRESS NOTES
Subjective:       Patient ID: Mckenzie Barrientos is a 47 y.o. female.    Chief Complaint: SUDARSHAN    The patient location is: home in [place]  The chief complaint leading to consultation is: SUDARSHAN    Visit type: audiovisual    Face to Face time with patient: 20 minutes  25 minutes of total time spent on the encounter, which includes face to face time and non-face to face time preparing to see the patient (eg, review of tests), Obtaining and/or reviewing separately obtained history, Documenting clinical information in the electronic or other health record, Independently interpreting results (not separately reported) and communicating results to the patient/family/caregiver, or Care coordination (not separately reported).         Each patient to whom he or she provides medical services by telemedicine is:  (1) informed of the relationship between the physician and patient and the respective role of any other health care provider with respect to management of the patient; and (2) notified that he or she may decline to receive medical services by telemedicine and may withdraw from such care at any time.    Notes:     48yo female referred by Olga Curry PA-C for SUDARSHAN  Home sleep study revealed moderate SUDARSHAN AHI 15  Snores, daytime fatigue, chronic migraines  Cymbalta, Wellbutrin   maxalt  Use to take Provigil for shift work disorder, does not currently take as she does not have this work schedule anymore  Sometimes takes melatonin to sleep  Lately has had more trouble falling asleep, she says this is a problem off and on, cannot pin point the cause  Feels like she never sleeps sometimes  No caffeine or alcohol prior to sleep  Calls herself a night owl  Works 3pm-11pm, contract travel nursing   wears CPAP      EPWORTH SLEEPINESS SCALE 6/27/2022   Sitting and reading 2   Watching TV 2   Sitting, inactive in a public place (e.g. a theatre or a meeting) 2   As a passenger in a car for an hour without a break 3   Lying  down to rest in the afternoon when circumstances permit 2   Sitting and talking to someone 0   Sitting quietly after a lunch without alcohol 2   In a car, while stopped for a few minutes in traffic 0   Total score 13     Immunization History   Administered Date(s) Administered    COVID-19, MRNA, LN-S, PF (MODERNA FULL 0.5 ML DOSE) 03/10/2022, 04/08/2022    Hepatitis B, Adult 05/18/1997, 06/26/1997, 01/15/1998    Influenza 09/02/2009, 11/04/2010, 10/12/2011, 10/17/2012, 10/16/2013, 10/20/2015, 10/10/2018    Influenza - Quadrivalent 12/02/2014    Influenza - Quadrivalent - PF *Preferred* (6 months and older) 10/30/2017, 11/03/2020    Influenza - Trivalent (ADULT) 09/02/2009, 11/04/2010, 10/12/2011, 10/17/2012, 10/16/2013, 10/20/2015, 12/02/2019    Influenza - Trivalent - PF (ADULT) 10/16/2018    MMR 03/04/1982, 07/11/1995    PPD Test 10/29/2008    Pneumococcal Conjugate - 13 Valent 02/14/2019    Td (ADULT) 07/11/1995    Td - PF (ADULT) 01/25/2018      Tobacco Use: High Risk    Smoking Tobacco Use: Current Every Day Smoker    Smokeless Tobacco Use: Never Used      Past Medical History:   Diagnosis Date    Acid reflux 3/1/2016    Hypertension     Kidney stones     kidney stone    Migraines     Obesity 3/1/2016    Ovarian torsion, acquired 9/27/2019    Tobacco abuse 3/1/2016      Current Outpatient Medications on File Prior to Visit   Medication Sig Dispense Refill    buPROPion (WELLBUTRIN SR) 150 MG TBSR 12 hr tablet Take 1 tablet (150 mg total) by mouth 2 (two) times daily. 60 tablet 0    dexlansoprazole (DEXILANT) 60 mg capsule Take 1 capsule (60 mg total) by mouth once daily. 30 capsule 5    DULoxetine (CYMBALTA) 30 MG capsule TAKE 1 CAPSULE BY MOUTH once daily 90 capsule 1    ergocalciferol (ERGOCALCIFEROL) 50,000 unit Cap ergocalciferol (vitamin D2) 1,250 mcg (50,000 unit) capsule   TAKE 1 CAPSULE BY MOUTH EVERY 7 days for 12 doses      galcanezumab-gnlm (EMGALITY PEN) 120 mg/mL PnIj  "Starter dose, inject both as shown by neuro nurse 2 mL 0    galcanezumab-gnlm (EMGALITY PEN) 120 mg/mL PnIj Maintenance, inject once every 28 days as show by neuro nurse 1 mL 11    lisinopriL 10 MG tablet TAKE 1 TABLET BY MOUTH once DAILY 90 tablet 1    metaxalone (SKELAXIN) 800 MG tablet metaxalone 800 mg tablet   TAKE 1 TABLET BY MOUTH 3 TIMES DAILY AS NEEDED FOR PAIN      predniSONE (DELTASONE) 20 MG tablet On full stomach (breakfast): 3 tabs for 2 days, 2 tabs for 2 days, 1 tab for 2 days. Finish 12 tablet 0    rizatriptan (MAXALT-MLT) 10 MG disintegrating tablet 1 melt at onset headache, second melt 2 hours later needed, no more than 2 pills day/3 days use in week 8 tablet 4    TROKENDI  mg Cp24 TAKE 1 CAPSULE BY MOUTH once daily 30 capsule 2    valACYclovir (VALTREX) 500 MG tablet TAKE 1 TABLET BY MOUTH TWICE DAILY FOR 5 DAYS AS DIRECTED, needs appointment       No current facility-administered medications on file prior to visit.        Review of Systems   Constitutional: Negative for fever, weight loss, appetite change, fatigue and weakness.   HENT: Negative for postnasal drip, rhinorrhea, sinus pressure, trouble swallowing and congestion.    Respiratory: Positive for apnea, snoring and somnolence. Negative for cough, sputum production, choking, chest tightness, shortness of breath, wheezing and dyspnea on extertion.    Cardiovascular: Negative for chest pain and leg swelling.   Musculoskeletal: Negative for arthralgias, gait problem and joint swelling.   Gastrointestinal: Negative for nausea, vomiting and abdominal pain.   Neurological: Negative for dizziness, weakness and headaches.   Psychiatric/Behavioral: Positive for sleep disturbance.   All other systems reviewed and are negative.      Objective:       Vitals:    06/28/22 1106   Weight: 89.8 kg (198 lb)   Height: 5' 3" (1.6 m)       Physical Exam   Constitutional: She is oriented to person, place, and time. She appears well-developed and " well-nourished. No distress. She is obese.   HENT:   Mouth/Throat: Oropharynx is clear and moist.   Pulmonary/Chest: Effort normal. No respiratory distress.   Musculoskeletal:         General: No edema.      Cervical back: Normal range of motion.   Neurological: She is alert and oriented to person, place, and time.   Skin: No rash noted.   Psychiatric: She has a normal mood and affect.   Vitals reviewed.    Personal Diagnostic Review    Home Sleep Studies     Date/Time: 6/22/2022 8:00 AM  Performed by: Jamie Mendoza MD  Authorized by: Violeta Tellez MD        2 night study  MODERATE OBSTRUCTIVE SLEEP APNEA with overall AHI 15.9/hr ( 127 events): night #1  Oxygen desaturation: 88%. SpO2 between 90% to 94% for 3 hr 23 min.  Patient snored 100 % time above 50 .  Heart rate range: 63 bpm - 94 bpm  REC's:  Please refer to sleep disorders clinic  Therapy with APAP at 4-15 cm WP using mask of choice with heated humidification is an option.  Weight loss/management. with regular exercise per direction of physician.  Avoid drowsy driving.  Follow up in sleep clinic to maximize adherence and ensure resolution of symptoms.        Assessment/Plan:       Problem List Items Addressed This Visit        Cardiac/Vascular    Primary hypertension     Controlled on lisinopirl              Endocrine    Severe obesity (BMI 35.0-39.9) with comorbidity     Weight loss and exercise to improve overall health.                Other    Tobacco dependence     Assistance with smoking cessation was offered, including:  [x]  Medications  [x]  Counseling  []  Printed Information on Smoking Cessation  []  Referral to a Smoking Cessation Program    Patient was counseled regarding smoking for 3-10 minutes.               Obstructive sleep apnea - Primary     Moderate SUDARSHAN  AutoPAP, nasal pillow ordered  Discussed therapeutic goals for CPAP: Ideal usage 100% of nights for 6-8 hours per night. Minimum usage is 70% of night for at least 4 hours per  night.             Relevant Orders    CPAP/BIPAP SUPPLIES    CPAP FOR HOME USE    Circadian rhythm disorder     Sleep hygiene  Will start CPAP and reassess               Follow up in about 10 weeks (around 9/6/2022) for new cpap visit next.    Discussed diagnosis, its evaluation, treatment and usual course. All questions answered.    Patient verbalized understanding of plan and left in no acute distress    Thank you for the courtesy of participating in the care of this patient    Xiomara Randle PA-C

## 2022-07-26 ENCOUNTER — PATIENT MESSAGE (OUTPATIENT)
Dept: NEUROLOGY | Facility: CLINIC | Age: 47
End: 2022-07-26
Payer: COMMERCIAL

## 2022-07-27 ENCOUNTER — PATIENT MESSAGE (OUTPATIENT)
Dept: NEUROLOGY | Facility: CLINIC | Age: 47
End: 2022-07-27
Payer: COMMERCIAL

## 2022-07-27 RX ORDER — TOPIRAMATE 50 MG/1
TABLET, FILM COATED ORAL
Qty: 50 TABLET | Refills: 0 | Status: SHIPPED | OUTPATIENT
Start: 2022-07-27 | End: 2022-09-12

## 2022-07-27 RX ORDER — TOPIRAMATE 50 MG/1
TABLET, FILM COATED ORAL
Qty: 50 TABLET | Refills: 0 | Status: SHIPPED | OUTPATIENT
Start: 2022-07-27 | End: 2022-07-27 | Stop reason: SDUPTHER

## 2022-08-08 ENCOUNTER — OFFICE VISIT (OUTPATIENT)
Dept: NEUROLOGY | Facility: CLINIC | Age: 47
End: 2022-08-08
Payer: COMMERCIAL

## 2022-08-08 DIAGNOSIS — G43.709 CHRONIC MIGRAINE WITHOUT AURA WITHOUT STATUS MIGRAINOSUS, NOT INTRACTABLE: Primary | ICD-10-CM

## 2022-08-08 DIAGNOSIS — M79.18 MYOFASCIAL PAIN: ICD-10-CM

## 2022-08-08 PROCEDURE — 4010F PR ACE/ARB THEARPY RXD/TAKEN: ICD-10-PCS | Mod: CPTII,95,, | Performed by: PHYSICIAN ASSISTANT

## 2022-08-08 PROCEDURE — 4010F ACE/ARB THERAPY RXD/TAKEN: CPT | Mod: CPTII,95,, | Performed by: PHYSICIAN ASSISTANT

## 2022-08-08 PROCEDURE — 99214 PR OFFICE/OUTPT VISIT, EST, LEVL IV, 30-39 MIN: ICD-10-PCS | Mod: 95,,, | Performed by: PHYSICIAN ASSISTANT

## 2022-08-08 PROCEDURE — 99214 OFFICE O/P EST MOD 30 MIN: CPT | Mod: 95,,, | Performed by: PHYSICIAN ASSISTANT

## 2022-08-08 RX ORDER — TIZANIDINE 2 MG/1
TABLET ORAL
Qty: 30 TABLET | Refills: 3 | Status: SHIPPED | OUTPATIENT
Start: 2022-08-08 | End: 2023-07-05

## 2022-08-08 NOTE — PROGRESS NOTES
[facility]  [address] [place] [address]  Phone:[phone]  Fax: [phone]   Follow up visit -telemed    Provider Location: Work         Name of Location: [facility]  City: [place]   State: [place]  Medium to connect: Video  Patient Location: in parked car  Name of Location:   In parked car                                   City: [place]                                                               State: [place]                                         Consent for Electronic Treatment:   This visit was conducted with the use of an interactive audio and/or video telecommunications system that permits real-time communication between the patient and this provider. The patient has submitted their consent to be treated electronically by way of this telephone and/or video technology.  The risks and limitations of the process of telemedicine have been conveyed verbally during this encounter.Each patient to whom he or she provides medical services by telemedicine is:  (1) informed of the relationship between the physician and patient and the respective role of any other health care provider with respect to management of the patient; and (2) notified that he or she may decline to receive medical services by telemedicine and may withdraw from such care at any time.    Face to Face time with patient:   30 minutes of total time spent on the encounter, which includes face to face time and non-face to face time preparing to see the patient (eg, review of tests), Obtaining and/or reviewing separately obtained history, Documenting clinical information in the electronic or other health record, Independently interpreting results (not separately reported) and communicating results to the patient/family/caregiver, or Care coordination (not separately reported).       Patient Name: Mckenzie Barrientos  : 1975  MRN:  [id number]  Today: 2022   LOV: 2022  chief  complaint: Headache    PCP: Violeta Tellez MD.    Assessment:   Mckenzie Barrientos is a 47 y.o. right handed female with a with a PMHx of: migraines, HTN,  GERD, kidney stones and tobacco use whom presents solo in follow up for HA.  PATRICIO appear to be chronic migrainous, lack of sleep (has SUDARSHAN, on week 3 of using CPAP) and anxiety/depression along with myofascial pain likely contribute.        Review:    ICD-10-CM ICD-9-CM   1. Chronic migraine without aura without status migrainosus, not intractable  G43.709 346.70   2. Myofascial pain  M79.18 729.1         Plan:               -if HA change in quality/nature, will get updated imaging study        For HA Prevention:  1 continue emgality   2 continue taper of topamax (d/t nephrolithiasis)   3 mood medicines per other providers  4 stop skelaxin (discussed at length, patient voiced agreement) and try zanaflex, 1 pill Q2 before bed, may help with sleep and myofascial pain, no driving, no use with ETOH, can use for a few weeks then switch to PRN Q2h before bed as needed myofascial pain and hA.   For HA :  Limit OTC to <3 days use in week  Has maxalt     To break up Headaches:  Can consider another course of steroids if needed, though effects were short lived     Other:  We discussed tobacco cessation, she will continue to work on reduction            All test results as well as any necessary instructions were reviewed and discussed with patient.    Review:  Orders Placed This Encounter    tiZANidine (ZANAFLEX) 2 MG tablet         Patient to return to PCP/other specialists for all other problems  Patient to continue on all medications as Rx'd  Full office note available for patient review online/secured patient portal   RTO-  6months, to review HA diary   The patient indicates understanding of these issues and agrees to the plan.    HPI:   Mckenzie Barrientos is a 47 y.o.right handed, female with a PMHx of: migraines, HTN,  GERD, kidney stones and tobacco use  "whom presents solo via telemed in follow up for HA.     At last visit, tapered off trokendi (using topamax), emgality and PT ordered and had mood medicines from other providers. Tobacco cessation was discussed, maxalt written and a course of prednisone written. A sleep study was completed and it showed she was positive for SUDARSHAN.   HA better, still daily HA (30 HD/30), but has gotten "better" with less "bad days of really intense pain. I had only 2 episodes that lasted 2 days, that's a win!"  Steroids were short lived but gave decent relief whilst on them.   No side effects from emgality feels that is helping  Still coming down on topamax (tapering off)   Didn't do PT-cost  Still smoking  HA still pretty much all day long  Feels TMJ could be factoring into some of her HA (?)--clenching jaw, has seen dentist in past for same issue  Feels "better" overall.  Has seen sleep specialist, week 3 of using CPAP, still with some trouble in sleep. " know when I get sleeping better and get off the cigarettes, I am going to feel better."    No other concerns      From previous notes:   HA HPI:  Start:HA since age 13 ("hormonal related"---otc and dark room HA went away), mid 20s became more frequent/stronger (numbness occurring as well)  History of trauma (5 years old fell off a horse), History of CNS infection (no), History of Stroke (no)  Location:right parietal and bitemporal, radiation to frontalis   Severity: range: 4-8/10  Duration:all day   Frequency:30 HD/30  Associated factors (bolded positive) WITH HA  or migraine): Nausea, vomiting, photophobia, phonophobia, tinnitus, scalp pain, vision loss, diplopia, blurred vision, scintillations, eye pain, jaw pain, weakness?    Tried:trokendi and cymbalta, will occasionally try OTC   Triggers (in bold): stress, lack of sleep, too much caffeine, too little caffeine, weather change, seasonal change, strong odours, bright lights, sunlight, food   Currently having a HA?:yes   Positives " "in bold: Hx of Kidney Stones, asthma, GI bleed, osteoporosis, CAD/MI, CVA/TIA, DM    Imaging on file: not in [facility] E[facility]   Therapies tried in past: (failures to be marked, if known---why did it fail?)  trokendi  Lisinopril  cymbalta  wellbutrin  fioricet  toradol  Inderal  viibryd  zomig  botox -expensive, did years ago   migranal  relpax  depakote  Diclofenac  ASA  Rizatriptan  maxalt  imitrex  Dilantin  bendaryl  topamax  emgality  Prednisone  zanaflex      From Dr. Block's notes (6/22/2017):  "    HPI   The patient is a pleasant 43 y/o female who presents to clinic to establish care after former Neurologist Dr. Zelalem Casey"s passing.   She had had migraines since the age of 13 that corresponded with her menstrual cycle.  They were regularly treated with aspirin with little problem.  At age 30 her migraines changed in frequency and intensity.  They became localized to either the R or L side or globally.  She describes her typical migraine as a throbbing pain that can be as intense as 10/10.  They are associated w/ dizziness, falls, blurred vision w/ loss of visual acuity. Previously has had Auras that look like halos around objects before they occur. Currently she is complaining of daily headaches that are on average 2/10 that intensify throughout the day that can progress to tension headache or a migraine.  Her current headaches get better with pressure and ice. Got "DOTH" piercing which she said helped her headaches for a short time 8 months ago. She has a Toradol injection that she can take if they progress for longer than 2 days.  Takes topomax 50mg daily.  Previously had done botox with Dr. Casey last August and is interested in getting more botox injections.  Has done lifestyle modification with WL and reducing stress which have helped.  Please see details of headache characteristics headache below.  Headache questionnaire     1. When did your Headaches start?          Began at age 13 and worsened at " age [age]        2. Where are your headaches located?                        Primarily right frontal /temporal but also bilat and diffuse          3. Your headache's characteristics:                        Excruciating, Pressure, Throbbing, Pounding, Like a tight band,         4. How long does the headache last?                        Hours, days         5. How often does the headache occur?                        daily        6. Are your headaches preceded or accompanied by other symptoms? yes                        If yes, please describe.  Visual disturbance/ blurred, nausea , dizziness         7. Does the headache awaken you at night? yes                        If so, how often? 3-4 times per month            8. Please yodit the word that best describes your headache's intensity:                         severe        9. Using a scale of 1 through 10, with 0 = no pain and 10 = the worst pain:                        What score is your headache now? 3                        What score is your headache at its worst? 8                        What score is your headache at its best? 3           10. Possible associated headache symptoms:  [x]?  Sensitivity to light                                      [x]? Dizziness                                        []? Nasal or sinus pressure/ pain                        [x]? Sensitivity to noise                                     [x]? Vertigo                                            [x]? Problems with concentration  [x]? Sensitivity to smells             []? Ringing in ears                     [x]? Problems with memory                                            [x]? Blurred vision                                 [x]? Irritability                                         [x]? Problems with task completion   []? Double vision                                 [x]? Anger                                  [x]?  Problems with relaxation  []? Loss of  appetite                                         []? Anxiety                                           [x]? Neck tightness, Neck pain  [x]? Nausea                                                                 []? Nasal congestion  [x]? Vomiting                                                                       11. Headache improving factors:  []? Sleep                                  []? Heat  [x]? Darkness                                        [x]? Ice  [x]? Local pressure                     []? Menses (period)  []? Massage                                        [x]? Medications:          12. Headache worsening factors:   []? Fatigue                     []? Sneezing                                        [x]? Changes in Weather  [x]? Light             []? Bending Over           []? Stress  [x]? Noise            []? Ovulation                                        []? Multiple Sclerosis Flare-Up  [x]? Smells                      []? Menses                                          []? Food   []? Coughing                  []? Alcohol        13. Number of caffeinated drinks per day: 1        14. Number of diet drinks per day:  0            AED Neuromodulators   MAOIs   Ergot Alkaloids     Acetazolamide (Diamox) []?  Phenelzine (Nardil) []?  Dihydroergotamine (Migranal) [x]?    Carbamazepine (Tegretol) []?  Tranylcypromine (Parnate) []?  Ergotamine (Ergomar) []?    Gabapentin (Neurontin) []?  Antihistamine/Serotonergic   Triptans     Lacosamide (Vimpat) []?  Cyproheptadine (Periactin) []?  Almotriptan (Axert) []?    Lamotrigine (Lamictal) []?  Antihypertensives   Eletriptan (Relpax) [x]?    Levatiracetam (Keppra) []?  Atenolol (Tenormin) []?  Frovatriptan (Frova) []?    Oxcarbazepine (Trileptal) []?  Bisoprostol (Zebeta) []?  Naratriptan (Amerge) []?    Phenobarbital []?  Candesartan (Atacand) []?  Rizatriptan (Maxalt) [x]?    Phenytoin (Dilantin) [x]?  Diltiazem (Cardizem) []?  Sumatriptan (Imitrex) [x]?   "  Pregabalin (Lyrica) []?  Lisinopril (Prinivil, Zestril) [x]?  Zolmitriptan (Zomig) [x]?    Primidone (Mysoline) []?  Metoprolol (Toprol) [x]?  Combo Abortives     Tiagabine (Gabatril) []?  Nadolol (Corgard) []?  Butalbital and Acetaminophen (Bupap) []?    Topiramate (Topamax)  (Trokendi) [x]?  Nicardipine (Cardene) []?      Vigabatrin (Sabril) []?  Nimodipine (Nimotop) []?  Butalbital, Acetaminophen, and caffeine (Fioricet) [x]?    Valproic Acid (Depakote) (Divalproex Sodium) [x]?  Propranolol (Inderal) [x]?      Zonisamide (Zonegran) []?  Telmisartan (Micardis) []?  Butalbital, Aspirin, and caffeine (Fiorinal) [x]?    Benzodiazepines   Timolol (Blocadren) []?      Alprazolam (Xanax) []?  Verapamil (Calan, Verelan) []?  Butalbital, Caffeine, Acetaminophen, and Codeine (Fioricet with Codeine) []?    Diazepam (Valium) []?  NSAIDs       Lorazepam (Ativan) []?  Acetaminophen (Tylenol) [x]?      Clonazepam (Klonopin) []?  Acetylsalicylic Acid (Aspirin) [x]?  Butalbital, Caffeine, Aspirin, and Codeine  (Fiorinal with Codeine) []?    Antidepressants   Diclofenac (Cambia) [x]?      Amitriptyline (Elavil) []?  Ibuprofen (Motrin) [x]?      Desipramine (Norpramin) []?  Indomethacin (Indocin) []?  Aspirin, Caffeine, and Acetaminophen (Excedrin) (Goodys) [x]?    "            Medication Reconciliation:   Current Outpatient Medications   Medication Sig Dispense Refill    buPROPion (WELLBUTRIN SR) 150 MG TBSR 12 hr tablet TAKE 1 TABLET(150 MG) BY MOUTH TWICE DAILY 180 tablet 3    dexlansoprazole (DEXILANT) 60 mg capsule Take 1 capsule (60 mg total) by mouth once daily. 30 capsule 5    DULoxetine (CYMBALTA) 30 MG capsule TAKE 1 CAPSULE BY MOUTH once daily 90 capsule 1    ergocalciferol (ERGOCALCIFEROL) 50,000 unit Cap ergocalciferol (vitamin D2) 1,250 mcg (50,000 unit) capsule   TAKE 1 CAPSULE BY MOUTH EVERY 7 days for 12 doses      galcanezumab-gnlm (EMGALITY PEN) 120 mg/mL PnIj Starter dose, inject both as shown by neuro " nurse 2 mL 0    galcanezumab-gnlm (EMGALITY PEN) 120 mg/mL PnIj Maintenance, inject once every 28 days as show by neuro nurse 1 mL 11    lisinopriL 10 MG tablet TAKE 1 TABLET BY MOUTH once DAILY 90 tablet 1    metaxalone (SKELAXIN) 800 MG tablet metaxalone 800 mg tablet   TAKE 1 TABLET BY MOUTH 3 TIMES DAILY AS NEEDED FOR PAIN      predniSONE (DELTASONE) 20 MG tablet On full stomach (breakfast): 3 tabs for 2 days, 2 tabs for 2 days, 1 tab for 2 days. Finish 12 tablet 0    rizatriptan (MAXALT-MLT) 10 MG disintegrating tablet 1 melt at onset headache, second melt 2 hours later needed, no more than 2 pills day/3 days use in week 8 tablet 4    topiramate (TOPAMAX) 50 MG tablet Taper schedule (take 2 hours before bed): 3 pills for 7 days, 2 pills for 7 days, 1 pill for 7 days, 1/2 a pill for 7 days, then off 50 tablet 0    TROKENDI  mg Cp24 TAKE 1 CAPSULE BY MOUTH once daily 30 capsule 2    valACYclovir (VALTREX) 500 MG tablet TAKE 1 TABLET BY MOUTH TWICE DAILY FOR 5 DAYS AS DIRECTED, needs appointment       No current facility-administered medications for this visit.     Review of patient's allergies indicates:   Allergen Reactions    Epinephrine Palpitations    Procaine Palpitations     Other reaction(s): Heart Palations, Heart Palations    Hydrochlorothiazide Anxiety     Other reaction(s): feels like she is crawling out of her skin, feels like she is crawling out of her skin, feels like she is crawling out of her skin       PMHx:  Past Medical History:   Diagnosis Date    Acid reflux 3/1/2016    Hypertension     Kidney stones     kidney stone    Migraines     Obesity 3/1/2016    Ovarian torsion, acquired 9/27/2019    Tobacco abuse 3/1/2016     Past Surgical History:   Procedure Laterality Date    DIAGNOSTIC LAPAROSCOPY WITH USE OF LASER N/A 09/27/2019    Procedure: LAPAROSCOPY, DIAGNOSTIC;  Surgeon: Fab Espitia MD;  Location: [facility];  Service: OB/GYN;  Laterality: N/A;     HYSTERECTOMY  2014    Partial    LAPAROSCOPIC LYSIS OF ADHESIONS N/A 2019    Procedure: LYSIS, ADHESIONS, LAPAROSCOPIC;  Surgeon: Fab Espitia MD;  Location: [facility] OR;  Service: OB/GYN;  Laterality: N/A;    LAPAROSCOPIC SALPINGO-OOPHORECTOMY Bilateral 2019    Procedure: SALPINGO-OOPHORECTOMY, LAPAROSCOPIC;  Surgeon: Fab Espitia MD;  Location: [facility] OR;  Service: OB/GYN;  Laterality: Bilateral;    laproscopy      LIPOMA RESECTION      Right Inner thigh    OOPHORECTOMY Bilateral        Fhx:  Family History   Problem Relation Age of Onset    Leukemia Mother     Lung cancer Father     Diabetes Father     Cancer Father         Lung ca    Breast cancer Sister 41    Cancer Sister         Breast ca       Shx: 1 pack per day tobacco   Social History     Socioeconomic History    Marital status:    Tobacco Use    Smoking status: Current Every Day Smoker     Packs/day: 0.50     Years: 15.00     Pack years: 7.50     Types: Cigarettes     Start date: 1998     Last attempt to quit: 10/9/2020     Years since quittin.8    Smokeless tobacco: Never Used   Substance and Sexual Activity    Alcohol use: Yes     Alcohol/week: 4.0 standard drinks     Types: 4 Drinks containing 0.5 oz of alcohol per week     Comment: rarely: cocktails    Drug use: No    Sexual activity: Yes     Partners: Male     Birth control/protection: See Surgical Hx     Social Determinants of Health     Financial Resource Strain: Low Risk     Difficulty of Paying Living Expenses: Not very hard   Food Insecurity: No Food Insecurity    Worried About Running Out of Food in the Last Year: Never true    Ran Out of Food in the Last Year: Never true   Transportation Needs: No Transportation Needs    Lack of Transportation (Medical): No    Lack of Transportation (Non-Medical): No   Physical Activity: Insufficiently Active    Days of Exercise per Week: 2 days    Minutes of Exercise per Session: 20 min    Stress: Stress Concern Present    Feeling of Stress : To some extent   Social Connections: Unknown    Frequency of Communication with Friends and Family: More than three times a week    Frequency of Social Gatherings with Friends and Family: Twice a week    Active Member of Clubs or Organizations: No    Attends Club or Organization Meetings: Patient refused    Marital Status:    Housing Stability: Low Risk     Unable to Pay for Housing in the Last Year: No    Number of Places Lived in the Last Year: 2    Unstable Housing in the Last Year: No           Labs:   Reviewed in chart     Imaging:   Reviewed in chart       Other testing:  Reviewed in chart     Note: I have independently reviewed any/all imaging/labs/tests and agree with the report (s) as documented.  Any discrepancies will be as noted/demarcated by free text.  SETH TANG 8/8/2022                     ROS:   Review Of Systems (questions asked, positive or additions in BOLD)  Gen: Weight change, fatigue/malaise, pyrexia   HEENT: Tinnitus, headache,  blurred vision, eye pain, diplopia, photophobia,  nose bleeds, congestion, sore throat, jaw pain, scalp pain, neck stiffness   Card: Palpitations, CP   Pulm: SOB, cough   Vas: Easy bruising, easy bleeding   GI: N/V/D/C, incontinence, hematemesis, hematochezia    : incontinence, hematuria   Endocrine: Temp intolerance, polyuria, polydipsia   M/S: Neck pain, myalgia, back pain, joint pain, falls    Neuro: PER HPI   PSY: Memory loss, confusion, depression, anxiety, trouble in sleep          EXAM:   There were no vitals taken for this visit. <---not taken d/t virtual visit   GEN:  NAD  HEENT: NC/AT,  sits with head forward posture      NEURO:  Mental Status:  Awake, alert and appropriately oriented to time, place, and person.  Normal attention and concentration.  Speech is fluent and appropriate language function (I.e., comprehension)    Cranial Nerves:   Extraocular movements are intact and without  nystagmus.  Vi   Facial movement is symmetric. Hearing appears intact. Uvula in midline. DROM of neck in all (6) directions, shoulder shrug symmetrical. Tongue in midline without fasiculation.     Motor:  Antigravity bilat UE  Tremor/pronator drift not apparent.       Gait and Stance: not tested          This document has been electronically signed by  Kameron BEARDENYelitza Zavala MPA, PA-C on 8/8/2022, I have personally typed this message using the EMR.       Dr Umair MD was available during today's visit.          CC: Violeta Tellez MD

## 2022-09-12 ENCOUNTER — OFFICE VISIT (OUTPATIENT)
Dept: PULMONOLOGY | Facility: CLINIC | Age: 47
End: 2022-09-12
Payer: COMMERCIAL

## 2022-09-12 VITALS
WEIGHT: 207 LBS | OXYGEN SATURATION: 99 % | SYSTOLIC BLOOD PRESSURE: 122 MMHG | HEART RATE: 94 BPM | HEIGHT: 63 IN | BODY MASS INDEX: 36.68 KG/M2 | DIASTOLIC BLOOD PRESSURE: 80 MMHG | RESPIRATION RATE: 17 BRPM

## 2022-09-12 DIAGNOSIS — G43.709 CHRONIC MIGRAINE WITHOUT AURA WITHOUT STATUS MIGRAINOSUS, NOT INTRACTABLE: ICD-10-CM

## 2022-09-12 DIAGNOSIS — K21.9 GASTROESOPHAGEAL REFLUX DISEASE WITHOUT ESOPHAGITIS: ICD-10-CM

## 2022-09-12 DIAGNOSIS — J30.89 NON-SEASONAL ALLERGIC RHINITIS, UNSPECIFIED TRIGGER: ICD-10-CM

## 2022-09-12 DIAGNOSIS — E66.01 SEVERE OBESITY (BMI 35.0-39.9) WITH COMORBIDITY: ICD-10-CM

## 2022-09-12 DIAGNOSIS — I10 PRIMARY HYPERTENSION: ICD-10-CM

## 2022-09-12 DIAGNOSIS — G47.33 OSA ON CPAP: Primary | ICD-10-CM

## 2022-09-12 PROCEDURE — 3008F BODY MASS INDEX DOCD: CPT | Mod: CPTII,S$GLB,, | Performed by: NURSE PRACTITIONER

## 2022-09-12 PROCEDURE — 3008F PR BODY MASS INDEX (BMI) DOCUMENTED: ICD-10-PCS | Mod: CPTII,S$GLB,, | Performed by: NURSE PRACTITIONER

## 2022-09-12 PROCEDURE — 4010F ACE/ARB THERAPY RXD/TAKEN: CPT | Mod: CPTII,S$GLB,, | Performed by: NURSE PRACTITIONER

## 2022-09-12 PROCEDURE — 99214 OFFICE O/P EST MOD 30 MIN: CPT | Mod: S$GLB,,, | Performed by: NURSE PRACTITIONER

## 2022-09-12 PROCEDURE — 1159F PR MEDICATION LIST DOCUMENTED IN MEDICAL RECORD: ICD-10-PCS | Mod: CPTII,S$GLB,, | Performed by: NURSE PRACTITIONER

## 2022-09-12 PROCEDURE — 3074F SYST BP LT 130 MM HG: CPT | Mod: CPTII,S$GLB,, | Performed by: NURSE PRACTITIONER

## 2022-09-12 PROCEDURE — 3079F PR MOST RECENT DIASTOLIC BLOOD PRESSURE 80-89 MM HG: ICD-10-PCS | Mod: CPTII,S$GLB,, | Performed by: NURSE PRACTITIONER

## 2022-09-12 PROCEDURE — 3074F PR MOST RECENT SYSTOLIC BLOOD PRESSURE < 130 MM HG: ICD-10-PCS | Mod: CPTII,S$GLB,, | Performed by: NURSE PRACTITIONER

## 2022-09-12 PROCEDURE — 3079F DIAST BP 80-89 MM HG: CPT | Mod: CPTII,S$GLB,, | Performed by: NURSE PRACTITIONER

## 2022-09-12 PROCEDURE — 99999 PR PBB SHADOW E&M-EST. PATIENT-LVL III: ICD-10-PCS | Mod: PBBFAC,,, | Performed by: NURSE PRACTITIONER

## 2022-09-12 PROCEDURE — 1160F PR REVIEW ALL MEDS BY PRESCRIBER/CLIN PHARMACIST DOCUMENTED: ICD-10-PCS | Mod: CPTII,S$GLB,, | Performed by: NURSE PRACTITIONER

## 2022-09-12 PROCEDURE — 4010F PR ACE/ARB THEARPY RXD/TAKEN: ICD-10-PCS | Mod: CPTII,S$GLB,, | Performed by: NURSE PRACTITIONER

## 2022-09-12 PROCEDURE — 1160F RVW MEDS BY RX/DR IN RCRD: CPT | Mod: CPTII,S$GLB,, | Performed by: NURSE PRACTITIONER

## 2022-09-12 PROCEDURE — 1159F MED LIST DOCD IN RCRD: CPT | Mod: CPTII,S$GLB,, | Performed by: NURSE PRACTITIONER

## 2022-09-12 PROCEDURE — 99214 PR OFFICE/OUTPT VISIT, EST, LEVL IV, 30-39 MIN: ICD-10-PCS | Mod: S$GLB,,, | Performed by: NURSE PRACTITIONER

## 2022-09-12 PROCEDURE — 99999 PR PBB SHADOW E&M-EST. PATIENT-LVL III: CPT | Mod: PBBFAC,,, | Performed by: NURSE PRACTITIONER

## 2022-09-12 NOTE — PROGRESS NOTES
Subjective:      Patient ID: Mckenzie Barrientos is a 47 y.o. female.    Chief Complaint: Sleep Apnea    HPI: Mckenzie Barrientos presents to clinic for follow up for SUDARSHAN with initial CPAP complaince assessment.  She is on Auto CPAP of 4-20 cmH2O pressure which is optimally controlling sleep apnea with apneic index (AHI) 1.0 events an hour.   She is compliant with CPAP use. Complaince download today reveals 83% of days with greater than 4 hours of device use.   Patient reports benefit from CPAP use and denies snoring or excessive daytime sleepiness.  Patient reports complaint of would like higher starting pressure, changed in clinic auto CPAP 6-12 cm  . Nasal wisp mask is tolerated.     06/15/2022, 06/16/2022 Home Sleep Study  2 night study  MODERATE OBSTRUCTIVE SLEEP APNEA with overall AHI 15.9/hr ( 127 events): night #1  Oxygen desaturation: 88%. SpO2 between 90% to 94% for 3 hr 23 min.  Patient snored 100 % time above 50 .  Heart rate range: 63 bpm - 94 bpm    Compliance Report  Compliance  Payor Standard  Usage 07/20/2022 - 09/11/2022  Usage days 53/54 days (98%)  >= 4 hours 45 days (83%)  < 4 hours 8 days (15%)  Usage hours 327 hours 27 minutes  Average usage (total days) 6 hours 4 minutes  Average usage (days used) 6 hours 11 minutes  Median usage (days used) 5 hours 53 minutes  AirSense 10 AutoSet  Serial number [id number]  Mode AutoSet  Min Pressure 4 cmH2O  Max Pressure 20 cmH2O  EPR Fulltime  EPR level 3  Response Standard  Therapy  Pressure - cmH2O Median: 8.3 95th percentile: 11.8 Maximum: 13.4  Leaks - L/min Median: 0.2 95th percentile: 4.1 Maximum: 16.5  Events per hour AI: 1.0 HI: 0.0 AHI: 1.0  Apnea Index Central: 0.1 Obstructive: 0.8 Unknown: 0.0  RERA Index 0.0  Cheyne-Nixon respiration (average duration per night) 0 minutes (0%)    [place] Sleepiness Scale   EPWORTH SLEEPINESS SCALE 9/12/2022 6/27/2022 6/10/2022   Sitting and reading 1 2 1   Watching TV 1 2 2   Sitting, inactive in a public  place (e.g. a theatre or a meeting) 0 2 2   As a passenger in a car for an hour without a break 0 3 2   Lying down to rest in the afternoon when circumstances permit 3 2 2   Sitting and talking to someone 0 0 1   Sitting quietly after a lunch without alcohol 0 2 3   In a car, while stopped for a few minutes in traffic 0 0 1   Total score 5 13 14       Previous Report Reviewed: lab reports and office notes     Past Medical History: The following portions of the patient's history were reviewed and updated as appropriate:   She  has a past surgical history that includes laproscopy; Lipoma resection; Diagnostic laparoscopy with use of laser (N/A, 09/27/2019); Laparoscopic salpingo-oophorectomy (Bilateral, 09/27/2019); Laparoscopic lysis of adhesions (N/A, 09/27/2019); Hysterectomy (2014); and Oophorectomy (Bilateral, 2019).  Her family history includes Breast cancer (age of onset: 41) in her sister; Cancer in her father and sister; Diabetes in her father; Leukemia in her mother; Lung cancer in her father.  She  reports that she has been smoking vaping with nicotine. She has never used smokeless tobacco. She reports current alcohol use of about 4.0 standard drinks per week. She reports that she does not use drugs.  She has a current medication list which includes the following prescription(s): bupropion, dexlansoprazole, duloxetine, emgality pen, lisinopril, metaxalone, rizatriptan, tizanidine, valacyclovir, ergocalciferol, and topiramate.  She is allergic to epinephrine, procaine, and hydrochlorothiazide..    The following portions of the patient's history were reviewed and updated as appropriate: allergies, current medications, past family history, past medical history, past social history, past surgical history and problem list.    Review of Systems   Constitutional:  Negative for fever, chills, weight loss, weight gain, activity change, appetite change, fatigue and night sweats.   HENT:  Negative for postnasal drip,  "rhinorrhea, sinus pressure, voice change and congestion.    Eyes:  Negative for redness and itching.   Respiratory:  Negative for snoring, cough, sputum production, chest tightness, shortness of breath, wheezing, orthopnea, asthma nighttime symptoms, dyspnea on extertion, use of rescue inhaler and somnolence.    Cardiovascular: Negative.  Negative for chest pain, palpitations and leg swelling.   Genitourinary:  Negative for difficulty urinating and hematuria.   Endocrine:  Negative for cold intolerance and heat intolerance.    Musculoskeletal:  Negative for arthralgias, gait problem, joint swelling and myalgias.   Skin: Negative.    Gastrointestinal:  Negative for nausea, vomiting, abdominal pain and acid reflux.   Neurological:  Negative for dizziness, weakness, light-headedness and headaches.   Hematological:  Negative for adenopathy. No excessive bruising.   All other systems reviewed and are negative.   Objective:   /80   Pulse 94   Resp 17   Ht 5' 3" (1.6 m)   Wt 93.9 kg (207 lb 0.2 oz)   SpO2 99%   BMI 36.67 kg/m²   Physical Exam  Vitals and nursing note reviewed.   Constitutional:       General: She is not in acute distress.     Appearance: Normal appearance. She is well-developed. She is not ill-appearing or toxic-appearing.   HENT:      Head: Normocephalic.      Right Ear: External ear normal.      Left Ear: External ear normal.      Nose: Nose normal.      Mouth/Throat:      Pharynx: No oropharyngeal exudate.   Eyes:      Conjunctiva/sclera: Conjunctivae normal.   Cardiovascular:      Rate and Rhythm: Normal rate and regular rhythm.      Heart sounds: Normal heart sounds.   Pulmonary:      Effort: Pulmonary effort is normal.      Breath sounds: Normal breath sounds. No stridor.   Abdominal:      Palpations: Abdomen is soft.   Musculoskeletal:         General: Normal range of motion.      Cervical back: Normal range of motion and neck supple.   Lymphadenopathy:      Cervical: No cervical " adenopathy.   Skin:     General: Skin is warm and dry.   Neurological:      Mental Status: She is alert and oriented to person, place, and time.   Psychiatric:         Behavior: Behavior normal. Behavior is cooperative.         Thought Content: Thought content normal.         Judgment: Judgment normal.       Personal Diagnostic Review  CPAP download    Assessment:     1. SUDARSHAN on CPAP    2. Severe obesity (BMI 35.0-39.9) with comorbidity    3. Chronic migraine without aura without status migrainosus, not intractable    4. Non-seasonal allergic rhinitis, unspecified trigger    5. Primary hypertension    6. Gastroesophageal reflux disease without esophagitis        Orders Placed This Encounter   Procedures    CPAP/BIPAP SUPPLIES     Benefits and compliant  90 day supply. 4 refills  HME: [facility]     Order Specific Question:   Length of need (1-99 months):     Answer:   99     Order Specific Question:   Choose ONE mask type and its corresponding cushions and/or pillows:     Answer:    Full Face Mask, 1 per 90 days:  Full Face Cushion, (3 per 90 days)     Order Specific Question:   Choose EITHER Heated or Non-Heated Tubjing     Answer:    Non-Heated Tubing, 1 per 90 days     Order Specific Question:   Number of Days Needed:     Answer:   99     Order Specific Question:   All other supplies as needed as listed below:     Answer:    Headgear, 1 per 180 days     Order Specific Question:   All other supplies as needed as listed below:     Answer:    Chin Strap, 1 per 180 days     Order Specific Question:   All other supplies as needed as listed below:     Answer:    Disposable Filter, 6 per 90 days     Order Specific Question:   All other supplies as needed as listed below:     Answer:    Humidifier Chamber, 1 per 180 days     Order Specific Question:   All other supplies as needed as listed below:     Answer:    Non-Disposable Filter, 1 per 180 days    HME - OTHER     Changed in clinic  "Auto CPAP 6-12 cm     Order Specific Question:   Type of Equipment:     Answer:   CPAP     Order Specific Question:   Height:     Answer:   5' 3" (1.6 m)     Order Specific Question:   Weight:     Answer:   93.9 kg (207 lb 0.2 oz)     Order Specific Question:   Does patient have medical equipment at home?     Answer:   CPAP       Plan:     Problem List Items Addressed This Visit       Severe obesity (BMI 35.0-39.9) with comorbidity     Encouraged calorie reduction and 30 minutes of exercise daily. Discussed impact of obesity on general health.  Wt Readings from Last 9 Encounters:   09/12/22 93.9 kg (207 lb 0.2 oz)   06/28/22 89.8 kg (198 lb)   06/13/22 89.8 kg (198 lb 1.3 oz)   06/09/22 90.8 kg (200 lb 2.8 oz)   01/03/22 94.1 kg (207 lb 6.4 oz)   06/07/21 91.4 kg (201 lb 9.6 oz)   03/02/21 91.6 kg (202 lb)   10/05/20 88.1 kg (194 lb 3.2 oz)   09/27/19 84.8 kg (186 lb 15.2 oz)   Body mass index is 36.67 kg/m².             Primary hypertension     Stable and controlled. Continue current treatment plan as previously prescribed with your PCP.              SUDARSHAN on CPAP - Primary     Moderate obstructive sleep apnea AHI 15.9  Benefits and compliant with Auto CPAP 4-20 cm  AHI 1.0   Patient request high starting pressures  Changed in clinic Auto CPAP 6-12 cm   Nasal wisp mask   HME: [facility]            Relevant Orders    CPAP/BIPAP SUPPLIES    HME - OTHER    Migraine without status migrainosus, not intractable     Managed by neurology          Gastroesophageal reflux disease without esophagitis     Controlled on Dexilant, managed by primary care provider          Allergic rhinitis     Controlled otc zyrtec              (DME) - [facility]  Reviewed therapeutic goals for positive airway pressure therapy Auto CPAP  Ideal is usage 100% of nights for 6 - 8 hours per night. Minimum usage is 70% of night for at least 4 hours per night used.     Follow up in about 1 year (around 9/12/2023) for CPAP 1 year compliance download.          "

## 2022-09-12 NOTE — ASSESSMENT & PLAN NOTE
Encouraged calorie reduction and 30 minutes of exercise daily. Discussed impact of obesity on general health.  Wt Readings from Last 9 Encounters:   09/12/22 93.9 kg (207 lb 0.2 oz)   06/28/22 89.8 kg (198 lb)   06/13/22 89.8 kg (198 lb 1.3 oz)   06/09/22 90.8 kg (200 lb 2.8 oz)   01/03/22 94.1 kg (207 lb 6.4 oz)   06/07/21 91.4 kg (201 lb 9.6 oz)   03/02/21 91.6 kg (202 lb)   10/05/20 88.1 kg (194 lb 3.2 oz)   09/27/19 84.8 kg (186 lb 15.2 oz)   Body mass index is 36.67 kg/m².

## 2022-09-15 ENCOUNTER — PATIENT MESSAGE (OUTPATIENT)
Dept: FAMILY MEDICINE | Facility: CLINIC | Age: 47
End: 2022-09-15
Payer: COMMERCIAL

## 2022-09-15 DIAGNOSIS — F43.23 SITUATIONAL MIXED ANXIETY AND DEPRESSIVE DISORDER: ICD-10-CM

## 2022-09-16 NOTE — TELEPHONE ENCOUNTER
No new care gaps identified.  [facility] Embedded Care Gaps. Reference number: [id number]. 9/16/2022   7:14:13 AM CDT

## 2022-09-19 RX ORDER — DULOXETIN HYDROCHLORIDE 30 MG/1
30 CAPSULE, DELAYED RELEASE ORAL DAILY
Qty: 90 CAPSULE | Refills: 3 | Status: SHIPPED | OUTPATIENT
Start: 2022-09-19 | End: 2023-10-19 | Stop reason: SDUPTHER

## 2022-09-21 ENCOUNTER — PATIENT MESSAGE (OUTPATIENT)
Dept: NEUROLOGY | Facility: CLINIC | Age: 47
End: 2022-09-21
Payer: COMMERCIAL

## 2022-11-21 ENCOUNTER — PATIENT MESSAGE (OUTPATIENT)
Dept: ADMINISTRATIVE | Facility: HOSPITAL | Age: 47
End: 2022-11-21
Payer: COMMERCIAL

## 2022-11-21 ENCOUNTER — TELEPHONE (OUTPATIENT)
Dept: PHARMACY | Facility: CLINIC | Age: 47
End: 2022-11-21
Payer: COMMERCIAL

## 2023-06-21 DIAGNOSIS — Z12.31 OTHER SCREENING MAMMOGRAM: ICD-10-CM

## 2023-07-05 RX ORDER — TIZANIDINE 2 MG/1
TABLET ORAL
Qty: 30 TABLET | Refills: 0 | Status: SHIPPED | OUTPATIENT
Start: 2023-07-05 | End: 2023-11-02 | Stop reason: SDUPTHER

## 2023-08-24 ENCOUNTER — CLINICAL SUPPORT (OUTPATIENT)
Dept: PRIMARY CARE CLINIC | Facility: CLINIC | Age: 48
End: 2023-08-24

## 2023-08-24 DIAGNOSIS — Z23 ENCOUNTER FOR IMMUNIZATION: Primary | ICD-10-CM

## 2023-08-24 PROCEDURE — 90471 PR IMMUNIZ ADMIN,1 SINGLE/COMB VAC/TOXOID: ICD-10-PCS | Mod: ,,, | Performed by: NURSE PRACTITIONER

## 2023-08-24 PROCEDURE — 90707 MMR VACCINE SC: CPT | Mod: ,,, | Performed by: NURSE PRACTITIONER

## 2023-08-24 PROCEDURE — 90471 IMMUNIZATION ADMIN: CPT | Mod: ,,, | Performed by: NURSE PRACTITIONER

## 2023-08-24 PROCEDURE — 90707 PR MMR VIRUS IMMUNIZATION, SUBCUT: ICD-10-PCS | Mod: ,,, | Performed by: NURSE PRACTITIONER

## 2023-09-05 ENCOUNTER — PATIENT MESSAGE (OUTPATIENT)
Dept: PULMONOLOGY | Facility: CLINIC | Age: 48
End: 2023-09-05
Payer: COMMERCIAL

## 2023-10-19 ENCOUNTER — OFFICE VISIT (OUTPATIENT)
Dept: FAMILY MEDICINE | Facility: CLINIC | Age: 48
End: 2023-10-19
Payer: COMMERCIAL

## 2023-10-19 VITALS
WEIGHT: 211.19 LBS | HEART RATE: 106 BPM | SYSTOLIC BLOOD PRESSURE: 131 MMHG | OXYGEN SATURATION: 99 % | DIASTOLIC BLOOD PRESSURE: 91 MMHG | RESPIRATION RATE: 18 BRPM | BODY MASS INDEX: 37.42 KG/M2 | TEMPERATURE: 98 F | HEIGHT: 63 IN

## 2023-10-19 DIAGNOSIS — G43.909 MIGRAINE WITHOUT STATUS MIGRAINOSUS, NOT INTRACTABLE, UNSPECIFIED MIGRAINE TYPE: ICD-10-CM

## 2023-10-19 DIAGNOSIS — Z13.220 ENCOUNTER FOR LIPID SCREENING FOR CARDIOVASCULAR DISEASE: ICD-10-CM

## 2023-10-19 DIAGNOSIS — I10 PRIMARY HYPERTENSION: Primary | ICD-10-CM

## 2023-10-19 DIAGNOSIS — Z13.6 ENCOUNTER FOR LIPID SCREENING FOR CARDIOVASCULAR DISEASE: ICD-10-CM

## 2023-10-19 DIAGNOSIS — R73.9 HYPERGLYCEMIA: ICD-10-CM

## 2023-10-19 PROCEDURE — 99999 PR PBB SHADOW E&M-EST. PATIENT-LVL IV: ICD-10-PCS | Mod: PBBFAC,,, | Performed by: PHYSICIAN ASSISTANT

## 2023-10-19 PROCEDURE — 3080F DIAST BP >= 90 MM HG: CPT | Mod: CPTII,S$GLB,, | Performed by: PHYSICIAN ASSISTANT

## 2023-10-19 PROCEDURE — 3008F BODY MASS INDEX DOCD: CPT | Mod: CPTII,S$GLB,, | Performed by: PHYSICIAN ASSISTANT

## 2023-10-19 PROCEDURE — 1159F PR MEDICATION LIST DOCUMENTED IN MEDICAL RECORD: ICD-10-PCS | Mod: CPTII,S$GLB,, | Performed by: PHYSICIAN ASSISTANT

## 2023-10-19 PROCEDURE — 3080F PR MOST RECENT DIASTOLIC BLOOD PRESSURE >= 90 MM HG: ICD-10-PCS | Mod: CPTII,S$GLB,, | Performed by: PHYSICIAN ASSISTANT

## 2023-10-19 PROCEDURE — 99214 PR OFFICE/OUTPT VISIT, EST, LEVL IV, 30-39 MIN: ICD-10-PCS | Mod: S$GLB,,, | Performed by: PHYSICIAN ASSISTANT

## 2023-10-19 PROCEDURE — 3075F PR MOST RECENT SYSTOLIC BLOOD PRESS GE 130-139MM HG: ICD-10-PCS | Mod: CPTII,S$GLB,, | Performed by: PHYSICIAN ASSISTANT

## 2023-10-19 PROCEDURE — 99999 PR PBB SHADOW E&M-EST. PATIENT-LVL IV: CPT | Mod: PBBFAC,,, | Performed by: PHYSICIAN ASSISTANT

## 2023-10-19 PROCEDURE — 99214 OFFICE O/P EST MOD 30 MIN: CPT | Mod: S$GLB,,, | Performed by: PHYSICIAN ASSISTANT

## 2023-10-19 PROCEDURE — 4010F ACE/ARB THERAPY RXD/TAKEN: CPT | Mod: CPTII,S$GLB,, | Performed by: PHYSICIAN ASSISTANT

## 2023-10-19 PROCEDURE — 3075F SYST BP GE 130 - 139MM HG: CPT | Mod: CPTII,S$GLB,, | Performed by: PHYSICIAN ASSISTANT

## 2023-10-19 PROCEDURE — 1160F PR REVIEW ALL MEDS BY PRESCRIBER/CLIN PHARMACIST DOCUMENTED: ICD-10-PCS | Mod: CPTII,S$GLB,, | Performed by: PHYSICIAN ASSISTANT

## 2023-10-19 PROCEDURE — 4010F PR ACE/ARB THEARPY RXD/TAKEN: ICD-10-PCS | Mod: CPTII,S$GLB,, | Performed by: PHYSICIAN ASSISTANT

## 2023-10-19 PROCEDURE — 3008F PR BODY MASS INDEX (BMI) DOCUMENTED: ICD-10-PCS | Mod: CPTII,S$GLB,, | Performed by: PHYSICIAN ASSISTANT

## 2023-10-19 PROCEDURE — 1160F RVW MEDS BY RX/DR IN RCRD: CPT | Mod: CPTII,S$GLB,, | Performed by: PHYSICIAN ASSISTANT

## 2023-10-19 PROCEDURE — 1159F MED LIST DOCD IN RCRD: CPT | Mod: CPTII,S$GLB,, | Performed by: PHYSICIAN ASSISTANT

## 2023-10-19 RX ORDER — AMLODIPINE BESYLATE 10 MG/1
10 TABLET ORAL DAILY
Qty: 30 TABLET | Refills: 11 | Status: SHIPPED | OUTPATIENT
Start: 2023-10-19 | End: 2024-10-18

## 2023-10-19 RX ORDER — ERGOCALCIFEROL 1.25 MG/1
CAPSULE ORAL
COMMUNITY
End: 2024-01-29

## 2023-10-19 RX ORDER — OMEPRAZOLE 20 MG/1
1 CAPSULE, DELAYED RELEASE ORAL DAILY
COMMUNITY

## 2023-10-19 RX ORDER — DULOXETIN HYDROCHLORIDE 30 MG/1
30 CAPSULE, DELAYED RELEASE ORAL DAILY
Qty: 90 CAPSULE | Refills: 3 | Status: SHIPPED | OUTPATIENT
Start: 2023-10-19

## 2023-10-19 RX ORDER — LISINOPRIL 5 MG/1
5 TABLET ORAL 2 TIMES DAILY
COMMUNITY
End: 2023-10-19

## 2023-10-19 RX ORDER — FLUTICASONE PROPIONATE 50 MCG
SPRAY, SUSPENSION (ML) NASAL
COMMUNITY

## 2023-10-19 RX ORDER — AMLODIPINE BESYLATE 5 MG/1
5 TABLET ORAL 2 TIMES DAILY
COMMUNITY
Start: 2023-10-10 | End: 2023-10-19

## 2023-10-19 RX ORDER — LISINOPRIL 20 MG/1
20 TABLET ORAL DAILY
Qty: 30 TABLET | Refills: 11 | Status: SHIPPED | OUTPATIENT
Start: 2023-10-19 | End: 2024-10-18

## 2023-10-19 NOTE — PROGRESS NOTES
Assessment/Plan:    Problem List Items Addressed This Visit          Neuro    Migraine without status migrainosus, not intractable    Overview     -previously followed by neurology  -remains on Cymbalta  -improvement of severity of migraines but still occurring frequently  -consider referral back to neurology if needed         Relevant Medications    DULoxetine (CYMBALTA) 30 MG capsule       Cardiac/Vascular    Primary hypertension - Primary    Overview     Hypertension Medications               amLODIPine (NORVASC) 5 MG tablet Take 5 mg by mouth 2 (two) times a day.    lisinopriL (PRINIVIL,ZESTRIL) 5 MG tablet Take 5 mg by mouth 2 (two) times daily.   -above goal today; has been elevated on recent home checks  -currently on amlodipine 5 mg BID and lisinopril 5 mg BID  -increase lisinopril to 20 mg QD; also switching amlodipine to 10 mg QD  -monitor BP at home; BP check in 2 weeks  -continue lifestyle modification with low sodium diet and exercise   -discussed hypertension disease course and importance of treating high blood pressure  -patient understood and advised of risk of untreated blood pressure. ER precautions were given for symptoms of hypertensive urgency and emergency.           Relevant Medications    amLODIPine (NORVASC) 10 MG tablet    lisinopriL (PRINIVIL,ZESTRIL) 20 MG tablet    Other Relevant Orders    CBC Auto Differential    Comprehensive Metabolic Panel    TSH     Other Visit Diagnoses       Encounter for lipid screening for cardiovascular disease        Relevant Orders    Lipid Panel    Hyperglycemia        Relevant Orders    Hemoglobin A1C          Follow up in about 2 weeks (around 11/2/2023), or if symptoms worsen or fail to improve, for BP check .    Olga Curry PA-C  _____________________________________________________________________________________________________________________________________________________    CC: elevated BP    HPI: Patient is in clinic today as an established  patient here for elevated BP. The patient has a diagnosis of hypertension and the blood pressure has been high on recent checks. She reports BP readings >150/100s. The patient has been compliant on the medicine listed and has not had problems with side effects. She has associated headaches. She has a history of chronic migraines, but she suspects her recent headaches to be related to her BP. She denies vision changes, chest pain, shortness of breath, dizziness, or palpitations. Denies any identifiable exacerbating or relieving factors. No other complaints today.    Past Medical History:   Diagnosis Date    Acid reflux 3/1/2016    Hypertension     Kidney stones     kidney stone    Migraines     Obesity 3/1/2016    Ovarian torsion, acquired 9/27/2019    Tobacco abuse 3/1/2016     Past Surgical History:   Procedure Laterality Date    DIAGNOSTIC LAPAROSCOPY WITH USE OF LASER N/A 09/27/2019    Procedure: LAPAROSCOPY, DIAGNOSTIC;  Surgeon: Fab Espitia MD;  Location: [facility];  Service: OB/GYN;  Laterality: N/A;    HYSTERECTOMY  2014    Partial    LAPAROSCOPIC LYSIS OF ADHESIONS N/A 09/27/2019    Procedure: LYSIS, ADHESIONS, LAPAROSCOPIC;  Surgeon: Fab Espitia MD;  Location: [facility] OR;  Service: OB/GYN;  Laterality: N/A;    LAPAROSCOPIC SALPINGO-OOPHORECTOMY Bilateral 09/27/2019    Procedure: SALPINGO-OOPHORECTOMY, LAPAROSCOPIC;  Surgeon: Fab Espitia MD;  Location: [facility] OR;  Service: OB/GYN;  Laterality: Bilateral;    laproscopy      LIPOMA RESECTION      Right Inner thigh    OOPHORECTOMY Bilateral 2019     Review of patient's allergies indicates:   Allergen Reactions    Epinephrine Palpitations    Procaine Palpitations     Other reaction(s): Heart Palations, Heart Palations    Hydrochlorothiazide Anxiety     Other reaction(s): feels like she is crawling out of her skin, feels like she is crawling out of her skin, feels like she is crawling out of her skin     Social History     Tobacco Use    Smoking  status: Every Day     Types: Vaping with nicotine    Smokeless tobacco: Never    Tobacco comments:     Former cigarette smoker, 20 pack years.    Substance Use Topics    Alcohol use: Yes     Alcohol/week: 4.0 standard drinks of alcohol     Types: 4 Drinks containing 0.5 oz of alcohol per week     Comment: rarely: cocktails    Drug use: No     Family History   Problem Relation Age of Onset    Leukemia Mother     Lung cancer Father     Diabetes Father     Cancer Father         Lung ca    Breast cancer Sister 41    Cancer Sister         Breast ca     Current Outpatient Medications on File Prior to Visit   Medication Sig Dispense Refill    ergocalciferol (ERGOCALCIFEROL) 50,000 unit Cap       fluticasone propionate (FLONASE) 50 mcg/actuation nasal spray       metaxalone (SKELAXIN) 800 MG tablet metaxalone 800 mg tablet   TAKE 1 TABLET BY MOUTH 3 TIMES DAILY AS NEEDED FOR PAIN      omeprazole (PRILOSEC) 20 MG capsule Take 1 capsule by mouth once daily.      tiZANidine (ZANAFLEX) 2 MG tablet TAKE 1 TABLET BY MOUTH 1 TO 2 HOURS BEFORE BEDTIME AS NEEDED FOR MUSCLE PAIN OR HEADACHE 30 tablet 0    [DISCONTINUED] amLODIPine (NORVASC) 5 MG tablet Take 5 mg by mouth 2 (two) times a day.      [DISCONTINUED] DULoxetine (CYMBALTA) 30 MG capsule Take 1 capsule (30 mg total) by mouth once daily. 90 capsule 3    [DISCONTINUED] galcanezumab-gnlm (EMGALITY PEN) 120 mg/mL PnIj Maintenance dose: inject once every 28 days as shown by neuro nurse 1 mL 11    [DISCONTINUED] lisinopriL (PRINIVIL,ZESTRIL) 5 MG tablet Take 5 mg by mouth 2 (two) times daily.      valACYclovir (VALTREX) 500 MG tablet TAKE 1 TABLET BY MOUTH TWICE DAILY FOR 5 DAYS AS DIRECTED, needs appointment      [DISCONTINUED] buPROPion (WELLBUTRIN SR) 150 MG TBSR 12 hr tablet TAKE 1 TABLET(150 MG) BY MOUTH TWICE DAILY 180 tablet 3    [DISCONTINUED] dexlansoprazole (DEXILANT) 60 mg capsule Take 1 capsule (60 mg total) by mouth once daily. 30 capsule 5    [DISCONTINUED]  "lisinopriL 10 MG tablet TAKE 1 TABLET BY MOUTH once DAILY 90 tablet 1    [DISCONTINUED] rizatriptan (MAXALT-MLT) 10 MG disintegrating tablet 1 melt at onset headache, second melt 2 hours later needed, no more than 2 pills day/3 days use in week 8 tablet 4     No current facility-administered medications on file prior to visit.       Review of Systems   Constitutional:  Negative for chills, diaphoresis, fatigue and fever.   HENT:  Negative for congestion, ear pain, postnasal drip, sinus pain and sore throat.    Eyes:  Negative for pain and redness.   Respiratory:  Negative for cough, chest tightness and shortness of breath.    Cardiovascular:  Negative for chest pain, palpitations and leg swelling.   Gastrointestinal:  Negative for abdominal pain, constipation, diarrhea, nausea and vomiting.   Genitourinary:  Negative for dysuria and hematuria.   Musculoskeletal:  Negative for arthralgias, joint swelling and neck pain.   Skin:  Negative for rash.   Neurological:  Positive for headaches. Negative for dizziness and syncope.   Psychiatric/Behavioral:  Negative for dysphoric mood. The patient is not nervous/anxious.        Vitals:    10/19/23 0942   BP: (!) 131/91   Pulse: 106   Resp: 18   Temp: 98 °F (36.7 °C)   SpO2: 99%   Weight: 95.8 kg (211 lb 3.2 oz)   Height: 5' 3" (1.6 m)       Wt Readings from Last 3 Encounters:   10/19/23 95.8 kg (211 lb 3.2 oz)   09/12/22 93.9 kg (207 lb 0.2 oz)   06/28/22 89.8 kg (198 lb)       Physical Exam  Constitutional:       General: She is not in acute distress.     Appearance: Normal appearance. She is well-developed.   HENT:      Head: Normocephalic and atraumatic.   Eyes:      Conjunctiva/sclera: Conjunctivae normal.   Cardiovascular:      Rate and Rhythm: Normal rate and regular rhythm.      Pulses: Normal pulses.      Heart sounds: Normal heart sounds. No murmur heard.  Pulmonary:      Effort: Pulmonary effort is normal. No respiratory distress.      Breath sounds: Normal breath " sounds.   Abdominal:      General: Bowel sounds are normal. There is no distension.      Palpations: Abdomen is soft.      Tenderness: There is no abdominal tenderness.   Musculoskeletal:         General: Normal range of motion.      Cervical back: Normal range of motion and neck supple.   Skin:     General: Skin is warm and dry.      Findings: No rash.   Neurological:      General: No focal deficit present.      Mental Status: She is alert and oriented to person, place, and time.   Psychiatric:         Mood and Affect: Mood normal.         Behavior: Behavior normal.         Health Maintenance   Topic Date Due    Colorectal Cancer Screening  Never done    Mammogram  06/13/2023    Lipid Panel  06/09/2027    TETANUS VACCINE  01/25/2028    Hepatitis C Screening  Completed

## 2023-10-24 ENCOUNTER — LAB VISIT (OUTPATIENT)
Dept: LAB | Facility: HOSPITAL | Age: 48
End: 2023-10-24
Attending: PHYSICIAN ASSISTANT
Payer: COMMERCIAL

## 2023-10-24 DIAGNOSIS — Z13.6 ENCOUNTER FOR LIPID SCREENING FOR CARDIOVASCULAR DISEASE: ICD-10-CM

## 2023-10-24 DIAGNOSIS — Z13.220 ENCOUNTER FOR LIPID SCREENING FOR CARDIOVASCULAR DISEASE: ICD-10-CM

## 2023-10-24 DIAGNOSIS — I10 PRIMARY HYPERTENSION: ICD-10-CM

## 2023-10-24 DIAGNOSIS — R73.9 HYPERGLYCEMIA: ICD-10-CM

## 2023-10-24 PROCEDURE — 80061 LIPID PANEL: CPT | Performed by: PHYSICIAN ASSISTANT

## 2023-10-24 PROCEDURE — 85025 COMPLETE CBC W/AUTO DIFF WBC: CPT | Performed by: PHYSICIAN ASSISTANT

## 2023-10-24 PROCEDURE — 84443 ASSAY THYROID STIM HORMONE: CPT | Performed by: PHYSICIAN ASSISTANT

## 2023-10-24 PROCEDURE — 80053 COMPREHEN METABOLIC PANEL: CPT | Performed by: PHYSICIAN ASSISTANT

## 2023-10-24 PROCEDURE — 36415 COLL VENOUS BLD VENIPUNCTURE: CPT | Mod: PO | Performed by: PHYSICIAN ASSISTANT

## 2023-10-24 PROCEDURE — 83036 HEMOGLOBIN GLYCOSYLATED A1C: CPT | Performed by: PHYSICIAN ASSISTANT

## 2023-10-25 LAB
ALBUMIN SERPL BCP-MCNC: 3.9 G/DL (ref 3.5–5.2)
ALP SERPL-CCNC: 85 U/L (ref 55–135)
ALT SERPL W/O P-5'-P-CCNC: 73 U/L (ref 10–44)
ANION GAP SERPL CALC-SCNC: 12 MMOL/L (ref 8–16)
AST SERPL-CCNC: 46 U/L (ref 10–40)
BASOPHILS # BLD AUTO: 0.1 K/UL (ref 0–0.2)
BASOPHILS NFR BLD: 1 % (ref 0–1.9)
BILIRUB SERPL-MCNC: 0.5 MG/DL (ref 0.1–1)
BUN SERPL-MCNC: 19 MG/DL (ref 6–20)
CALCIUM SERPL-MCNC: 10 MG/DL (ref 8.7–10.5)
CHLORIDE SERPL-SCNC: 102 MMOL/L (ref 95–110)
CHOLEST SERPL-MCNC: 211 MG/DL (ref 120–199)
CHOLEST/HDLC SERPL: 4.7 {RATIO} (ref 2–5)
CO2 SERPL-SCNC: 25 MMOL/L (ref 23–29)
CREAT SERPL-MCNC: 0.9 MG/DL (ref 0.5–1.4)
DIFFERENTIAL METHOD: NORMAL
EOSINOPHIL # BLD AUTO: 0.2 K/UL (ref 0–0.5)
EOSINOPHIL NFR BLD: 2 % (ref 0–8)
ERYTHROCYTE [DISTWIDTH] IN BLOOD BY AUTOMATED COUNT: 12.1 % (ref 11.5–14.5)
EST. GFR  (NO RACE VARIABLE): >60 ML/MIN/1.73 M^2
ESTIMATED AVG GLUCOSE: 131 MG/DL (ref 68–131)
GLUCOSE SERPL-MCNC: 152 MG/DL (ref 70–110)
HBA1C MFR BLD: 6.2 % (ref 4–5.6)
HCT VFR BLD AUTO: 48.3 % (ref 37–48.5)
HDLC SERPL-MCNC: 45 MG/DL (ref 40–75)
HDLC SERPL: 21.3 % (ref 20–50)
HGB BLD-MCNC: 15.6 G/DL (ref 12–16)
IMM GRANULOCYTES # BLD AUTO: 0.03 K/UL (ref 0–0.04)
IMM GRANULOCYTES NFR BLD AUTO: 0.3 % (ref 0–0.5)
LDLC SERPL CALC-MCNC: 134.4 MG/DL (ref 63–159)
LYMPHOCYTES # BLD AUTO: 3.3 K/UL (ref 1–4.8)
LYMPHOCYTES NFR BLD: 32.3 % (ref 18–48)
MCH RBC QN AUTO: 30.4 PG (ref 27–31)
MCHC RBC AUTO-ENTMCNC: 32.3 G/DL (ref 32–36)
MCV RBC AUTO: 94 FL (ref 82–98)
MONOCYTES # BLD AUTO: 0.7 K/UL (ref 0.3–1)
MONOCYTES NFR BLD: 6.5 % (ref 4–15)
NEUTROPHILS # BLD AUTO: 5.9 K/UL (ref 1.8–7.7)
NEUTROPHILS NFR BLD: 57.9 % (ref 38–73)
NONHDLC SERPL-MCNC: 166 MG/DL
NRBC BLD-RTO: 0 /100 WBC
PLATELET # BLD AUTO: 285 K/UL (ref 150–450)
PMV BLD AUTO: 10.1 FL (ref 9.2–12.9)
POTASSIUM SERPL-SCNC: 4.1 MMOL/L (ref 3.5–5.1)
PROT SERPL-MCNC: 7 G/DL (ref 6–8.4)
RBC # BLD AUTO: 5.14 M/UL (ref 4–5.4)
SODIUM SERPL-SCNC: 139 MMOL/L (ref 136–145)
TRIGL SERPL-MCNC: 158 MG/DL (ref 30–150)
TSH SERPL DL<=0.005 MIU/L-ACNC: 2 UIU/ML (ref 0.4–4)
WBC # BLD AUTO: 10.11 K/UL (ref 3.9–12.7)

## 2023-10-25 NOTE — PROGRESS NOTES
Results have been released via [employer]. Please verify that these have been viewed by patient. If not, please call patient with results.     I have sent a message to them with the following interpretation (see below).    I have reviewed your recent blood work.     A1C ABNORMAL-The A1c is elevated and consistent with prediabetes. Please focus on healthy diet and exercise. We will repeat an a1c in 6 months.     CBC NORMAL-The CBC appears to be stable at this time with no sign of major anemia, abnormal white count or platelet abnormality.  CMP/BMP NORMAL-The electrolytes all appear stable at this time. This includes kidney functions along with routine electrolytes like potassium and sodium. Glucose was elevated. Liver enzymes were also elevated. We will need to check this lab again in a few weeks and if it remains elevated, we will proceed with a further workup.   LIPID-ABNORMAL-Your cholesterol is slightly elevated, however your overall risk of cardiovascular disease is still low. I would still recommend working on lifestyle modification with low fat, low carb diet and exercise to improve these numbers and further decrease your risk of heart disease.  TSH NORMAL-The TSH screening indicated a normal function of the thyroid.    We can further discuss these results at your follow up appointment.    Please do not hesitate to call or message with any additional questions or concerns.    Olga Curry PA-C

## 2023-11-02 ENCOUNTER — OFFICE VISIT (OUTPATIENT)
Dept: FAMILY MEDICINE | Facility: CLINIC | Age: 48
End: 2023-11-02
Payer: COMMERCIAL

## 2023-11-02 ENCOUNTER — PATIENT OUTREACH (OUTPATIENT)
Dept: ADMINISTRATIVE | Facility: HOSPITAL | Age: 48
End: 2023-11-02
Payer: COMMERCIAL

## 2023-11-02 ENCOUNTER — LAB VISIT (OUTPATIENT)
Dept: LAB | Facility: HOSPITAL | Age: 48
End: 2023-11-02
Attending: PHYSICIAN ASSISTANT
Payer: COMMERCIAL

## 2023-11-02 VITALS
BODY MASS INDEX: 37.41 KG/M2 | DIASTOLIC BLOOD PRESSURE: 87 MMHG | WEIGHT: 211.13 LBS | RESPIRATION RATE: 14 BRPM | TEMPERATURE: 98 F | OXYGEN SATURATION: 100 % | HEART RATE: 81 BPM | HEIGHT: 63 IN | SYSTOLIC BLOOD PRESSURE: 121 MMHG

## 2023-11-02 DIAGNOSIS — E78.5 HYPERLIPIDEMIA, UNSPECIFIED HYPERLIPIDEMIA TYPE: ICD-10-CM

## 2023-11-02 DIAGNOSIS — R73.03 PREDIABETES: ICD-10-CM

## 2023-11-02 DIAGNOSIS — R74.8 ELEVATED LIVER ENZYMES: ICD-10-CM

## 2023-11-02 DIAGNOSIS — E66.01 SEVERE OBESITY (BMI 35.0-39.9) WITH COMORBIDITY: ICD-10-CM

## 2023-11-02 DIAGNOSIS — R60.0 BILATERAL LOWER EXTREMITY EDEMA: ICD-10-CM

## 2023-11-02 DIAGNOSIS — G43.909 MIGRAINE WITHOUT STATUS MIGRAINOSUS, NOT INTRACTABLE, UNSPECIFIED MIGRAINE TYPE: ICD-10-CM

## 2023-11-02 DIAGNOSIS — I10 PRIMARY HYPERTENSION: Primary | ICD-10-CM

## 2023-11-02 LAB
ALBUMIN SERPL BCP-MCNC: 3.9 G/DL (ref 3.5–5.2)
ALP SERPL-CCNC: 80 U/L (ref 55–135)
ALT SERPL W/O P-5'-P-CCNC: 80 U/L (ref 10–44)
AST SERPL-CCNC: 54 U/L (ref 10–40)
BILIRUB DIRECT SERPL-MCNC: 0.1 MG/DL (ref 0.1–0.3)
BILIRUB SERPL-MCNC: 0.4 MG/DL (ref 0.1–1)
BNP SERPL-MCNC: <10 PG/ML (ref 0–99)
PROT SERPL-MCNC: 7.1 G/DL (ref 6–8.4)

## 2023-11-02 PROCEDURE — 4010F PR ACE/ARB THEARPY RXD/TAKEN: ICD-10-PCS | Mod: CPTII,S$GLB,, | Performed by: PHYSICIAN ASSISTANT

## 2023-11-02 PROCEDURE — 3079F DIAST BP 80-89 MM HG: CPT | Mod: CPTII,S$GLB,, | Performed by: PHYSICIAN ASSISTANT

## 2023-11-02 PROCEDURE — 1160F RVW MEDS BY RX/DR IN RCRD: CPT | Mod: CPTII,S$GLB,, | Performed by: PHYSICIAN ASSISTANT

## 2023-11-02 PROCEDURE — 99999 PR PBB SHADOW E&M-EST. PATIENT-LVL V: ICD-10-PCS | Mod: PBBFAC,,, | Performed by: PHYSICIAN ASSISTANT

## 2023-11-02 PROCEDURE — 3008F BODY MASS INDEX DOCD: CPT | Mod: CPTII,S$GLB,, | Performed by: PHYSICIAN ASSISTANT

## 2023-11-02 PROCEDURE — 36415 COLL VENOUS BLD VENIPUNCTURE: CPT | Mod: PO | Performed by: PHYSICIAN ASSISTANT

## 2023-11-02 PROCEDURE — 80076 HEPATIC FUNCTION PANEL: CPT | Performed by: PHYSICIAN ASSISTANT

## 2023-11-02 PROCEDURE — 4010F ACE/ARB THERAPY RXD/TAKEN: CPT | Mod: CPTII,S$GLB,, | Performed by: PHYSICIAN ASSISTANT

## 2023-11-02 PROCEDURE — 3008F PR BODY MASS INDEX (BMI) DOCUMENTED: ICD-10-PCS | Mod: CPTII,S$GLB,, | Performed by: PHYSICIAN ASSISTANT

## 2023-11-02 PROCEDURE — 99214 OFFICE O/P EST MOD 30 MIN: CPT | Mod: S$GLB,,, | Performed by: PHYSICIAN ASSISTANT

## 2023-11-02 PROCEDURE — 3079F PR MOST RECENT DIASTOLIC BLOOD PRESSURE 80-89 MM HG: ICD-10-PCS | Mod: CPTII,S$GLB,, | Performed by: PHYSICIAN ASSISTANT

## 2023-11-02 PROCEDURE — 1159F MED LIST DOCD IN RCRD: CPT | Mod: CPTII,S$GLB,, | Performed by: PHYSICIAN ASSISTANT

## 2023-11-02 PROCEDURE — 3074F SYST BP LT 130 MM HG: CPT | Mod: CPTII,S$GLB,, | Performed by: PHYSICIAN ASSISTANT

## 2023-11-02 PROCEDURE — 83880 ASSAY OF NATRIURETIC PEPTIDE: CPT | Performed by: PHYSICIAN ASSISTANT

## 2023-11-02 PROCEDURE — 99999 PR PBB SHADOW E&M-EST. PATIENT-LVL V: CPT | Mod: PBBFAC,,, | Performed by: PHYSICIAN ASSISTANT

## 2023-11-02 PROCEDURE — 3044F HG A1C LEVEL LT 7.0%: CPT | Mod: CPTII,S$GLB,, | Performed by: PHYSICIAN ASSISTANT

## 2023-11-02 PROCEDURE — 3074F PR MOST RECENT SYSTOLIC BLOOD PRESSURE < 130 MM HG: ICD-10-PCS | Mod: CPTII,S$GLB,, | Performed by: PHYSICIAN ASSISTANT

## 2023-11-02 PROCEDURE — 99214 PR OFFICE/OUTPT VISIT, EST, LEVL IV, 30-39 MIN: ICD-10-PCS | Mod: S$GLB,,, | Performed by: PHYSICIAN ASSISTANT

## 2023-11-02 PROCEDURE — 1159F PR MEDICATION LIST DOCUMENTED IN MEDICAL RECORD: ICD-10-PCS | Mod: CPTII,S$GLB,, | Performed by: PHYSICIAN ASSISTANT

## 2023-11-02 PROCEDURE — 1160F PR REVIEW ALL MEDS BY PRESCRIBER/CLIN PHARMACIST DOCUMENTED: ICD-10-PCS | Mod: CPTII,S$GLB,, | Performed by: PHYSICIAN ASSISTANT

## 2023-11-02 PROCEDURE — 3044F PR MOST RECENT HEMOGLOBIN A1C LEVEL <7.0%: ICD-10-PCS | Mod: CPTII,S$GLB,, | Performed by: PHYSICIAN ASSISTANT

## 2023-11-02 RX ORDER — FUROSEMIDE 20 MG/1
20 TABLET ORAL DAILY PRN
Qty: 30 TABLET | Refills: 2 | Status: SHIPPED | OUTPATIENT
Start: 2023-11-02 | End: 2024-01-31

## 2023-11-02 RX ORDER — TIZANIDINE 2 MG/1
TABLET ORAL
Qty: 30 TABLET | Refills: 5 | Status: SHIPPED | OUTPATIENT
Start: 2023-11-02

## 2023-11-02 NOTE — PROGRESS NOTES
Assessment/Plan:    Problem List Items Addressed This Visit          Neuro    Migraine without status migrainosus, not intractable    Overview     -previously followed by neurology  -remains on Cymbalta and PRN muscle relaxants   -improvement of severity of migraines but still occurring frequently  -consider referral back to neurology if needed         Relevant Medications    tiZANidine (ZANAFLEX) 2 MG tablet       Cardiac/Vascular    Primary hypertension - Primary    Overview     Hypertension Medications               amLODIPine (NORVASC) 10 MG tablet Take 1 tablet (10 mg total) by mouth once daily.    lisinopriL (PRINIVIL,ZESTRIL) 20 MG tablet Take 1 tablet (20 mg total) by mouth once daily.      -at goal today  -continue lifestyle modification with low sodium diet and exercise   -discussed hypertension disease course and importance of treating high blood pressure  -patient understood and advised of risk of untreated blood pressure. ER precautions were given for symptoms of hypertensive urgency and emergency.           Hyperlipidemia    Overview     -chronic condition. Currently stable.    -ASCVD risk score: 5.1%-borderline risk  -managing with diet; consider statin in the future if needed  -most recent labs listed below:  Lab Results   Component Value Date    CHOL 211 (H) 10/24/2023     Lab Results   Component Value Date    HDL 45 10/24/2023     Lab Results   Component Value Date    LDLCALC 134.4 10/24/2023     Lab Results   Component Value Date    TRIG 158 (H) 10/24/2023     Lab Results   Component Value Date    ALT 73 (H) 10/24/2023    AST 46 (H) 10/24/2023    ALKPHOS 85 10/24/2023    BILITOT 0.5 10/24/2023               Endocrine    Severe obesity (BMI 35.0-39.9) with comorbidity    Overview     General weight loss/lifestyle modification strategies discussed (elicit support from others; identify saboteurs; non-food rewards, etc).  Informal exercise measures discussed, e.g. taking stairs instead of  elevator.  Regular aerobic exercise program discussed.           Prediabetes    Overview     Lab Results   Component Value Date    HGBA1C 6.2 (H) 10/24/2023   -managed with diet          Other Visit Diagnoses       Bilateral lower extremity edema    -LE edema over the past few days  -recent labs unremarkable  -checking BNP today  -suspected to be 2/2 Amlodipine  -start PRN Lasix  -will continue to monitor         Relevant Medications    furosemide (LASIX) 20 MG tablet    Other Relevant Orders    B-TYPE NATRIURETIC PEPTIDE    Elevated liver enzymes        Relevant Orders    HEPATIC FUNCTION PANEL          Follow up in about 6 months (around 5/2/2024), or if symptoms worsen or fail to improve.    Olga Curry PA-C  _____________________________________________________________________________________________________________________________________________________    CC: BP check    HPI: Patient is in clinic today as an established patient here for BP check.    HTN: The patient has a diagnosis of hypertension and the blood pressure has been high on recent checks. At last visit, Amlodipine was switched to 10 mg QD and Lisinopril was increased to 20 mg QD. BP at goal today and has been at goal at home since adjusting her medication. The patient has been compliant on the medicine listed and has not had problems with side effects.  She has noticed bilateral LE edema over the past few days. Denies chest pain, palpitations, shortness of breath, dyspnea on exertion, left arm or neck pain, nausea, vomiting, diaphoresis, PND and orthopnea. Denies any identifiable exacerbating or relieving factors.    Remaining chronic conditions have been reviewed and remain stable. Further detail as stated above.     No other complaints today.    Past Medical History:   Diagnosis Date    Acid reflux 3/1/2016    Hypertension     Kidney stones     kidney stone    Migraines     Obesity 3/1/2016    Ovarian torsion, acquired 9/27/2019    Tobacco  abuse 3/1/2016     Past Surgical History:   Procedure Laterality Date    DIAGNOSTIC LAPAROSCOPY WITH USE OF LASER N/A 09/27/2019    Procedure: LAPAROSCOPY, DIAGNOSTIC;  Surgeon: Fab Espitia MD;  [facility];  Service: OB/GYN;  Laterality: N/A;    HYSTERECTOMY  2014    Partial    LAPAROSCOPIC LYSIS OF ADHESIONS N/A 09/27/2019    Procedure: LYSIS, ADHESIONS, LAPAROSCOPIC;  Surgeon: Fab Espitia MD;  Location: [facility] OR;  Service: OB/GYN;  Laterality: N/A;    LAPAROSCOPIC SALPINGO-OOPHORECTOMY Bilateral 09/27/2019    Procedure: SALPINGO-OOPHORECTOMY, LAPAROSCOPIC;  Surgeon: Fab Espitia MD;  [facility];  Service: OB/GYN;  Laterality: Bilateral;    laproscopy      LIPOMA RESECTION      Right Inner thigh    OOPHORECTOMY Bilateral 2019     Review of patient's allergies indicates:   Allergen Reactions    Epinephrine Palpitations    Procaine Palpitations     Other reaction(s): Heart Palations, Heart Palations    Hydrochlorothiazide Anxiety     Other reaction(s): feels like she is crawling out of her skin, feels like she is crawling out of her skin, feels like she is crawling out of her skin     Social History     Tobacco Use    Smoking status: Every Day     Types: Vaping with nicotine    Smokeless tobacco: Never    Tobacco comments:     Former cigarette smoker, 20 pack years.    Substance Use Topics    Alcohol use: Yes     Alcohol/week: 4.0 standard drinks of alcohol     Types: 4 Drinks containing 0.5 oz of alcohol per week     Comment: rarely: cocktails    Drug use: No     Family History   Problem Relation Age of Onset    Leukemia Mother     Lung cancer Father     Diabetes Father     Cancer Father         Lung ca    Breast cancer Sister 41    Cancer Sister         Breast ca     Current Outpatient Medications on File Prior to Visit   Medication Sig Dispense Refill    amLODIPine (NORVASC) 10 MG tablet Take 1 tablet (10 mg total) by mouth once daily. 30 tablet 11    DULoxetine (CYMBALTA) 30 MG  "capsule Take 1 capsule (30 mg total) by mouth once daily. 90 capsule 3    ergocalciferol (ERGOCALCIFEROL) 50,000 unit Cap       fluticasone propionate (FLONASE) 50 mcg/actuation nasal spray       lisinopriL (PRINIVIL,ZESTRIL) 20 MG tablet Take 1 tablet (20 mg total) by mouth once daily. 30 tablet 11    metaxalone (SKELAXIN) 800 MG tablet metaxalone 800 mg tablet   TAKE 1 TABLET BY MOUTH 3 TIMES DAILY AS NEEDED FOR PAIN      omeprazole (PRILOSEC) 20 MG capsule Take 1 capsule by mouth once daily.      valACYclovir (VALTREX) 500 MG tablet TAKE 1 TABLET BY MOUTH TWICE DAILY FOR 5 DAYS AS DIRECTED, needs appointment      [DISCONTINUED] tiZANidine (ZANAFLEX) 2 MG tablet TAKE 1 TABLET BY MOUTH 1 TO 2 HOURS BEFORE BEDTIME AS NEEDED FOR MUSCLE PAIN OR HEADACHE 30 tablet 0     No current facility-administered medications on file prior to visit.       Review of Systems   Constitutional:  Negative for chills, diaphoresis, fatigue and fever.   HENT:  Negative for congestion, ear pain, postnasal drip, sinus pain and sore throat.    Eyes:  Negative for pain and redness.   Respiratory:  Negative for cough, chest tightness and shortness of breath.    Cardiovascular:  Positive for leg swelling. Negative for chest pain.   Gastrointestinal:  Negative for abdominal pain, constipation, diarrhea, nausea and vomiting.   Genitourinary:  Negative for dysuria and hematuria.   Musculoskeletal:  Negative for arthralgias and joint swelling.   Skin:  Negative for rash.   Neurological:  Negative for dizziness, syncope and headaches.   Psychiatric/Behavioral:  Negative for dysphoric mood. The patient is not nervous/anxious.        Vitals:    11/02/23 0818   BP: 121/87   Pulse: 81   Resp: 14   Temp: 97.6 °F (36.4 °C)   TempSrc: Tympanic   SpO2: 100%   Weight: 95.8 kg (211 lb 1.6 oz)   Height: 5' 3" (1.6 m)       Wt Readings from Last 3 Encounters:   11/02/23 95.8 kg (211 lb 1.6 oz)   10/19/23 95.8 kg (211 lb 3.2 oz)   09/12/22 93.9 kg (207 lb 0.2 " oz)       Physical Exam  Constitutional:       General: She is not in acute distress.     Appearance: Normal appearance. She is well-developed.   HENT:      Head: Normocephalic and atraumatic.   Eyes:      Conjunctiva/sclera: Conjunctivae normal.   Cardiovascular:      Rate and Rhythm: Normal rate and regular rhythm.      Pulses: Normal pulses.      Heart sounds: Normal heart sounds. No murmur heard.  Pulmonary:      Effort: Pulmonary effort is normal. No respiratory distress.      Breath sounds: Normal breath sounds.   Abdominal:      General: Bowel sounds are normal. There is no distension.      Palpations: Abdomen is soft.      Tenderness: There is no abdominal tenderness.   Musculoskeletal:         General: Normal range of motion.      Cervical back: Normal range of motion and neck supple.      Right lower leg: No edema.      Left lower leg: No edema.   Skin:     General: Skin is warm and dry.      Findings: No rash.   Neurological:      General: No focal deficit present.      Mental Status: She is alert and oriented to person, place, and time.   Psychiatric:         Mood and Affect: Mood normal.         Behavior: Behavior normal.         Health Maintenance   Topic Date Due    Colorectal Cancer Screening  Never done    Mammogram  06/13/2023    TETANUS VACCINE  01/25/2028    Lipid Panel  10/24/2028    Hepatitis C Screening  Completed

## 2023-11-02 NOTE — PROGRESS NOTES
BR Continuity report: Per chart review, patient had appointment with primary care on 10/19/23 and 11/2/23.

## 2023-11-03 ENCOUNTER — PATIENT MESSAGE (OUTPATIENT)
Dept: FAMILY MEDICINE | Facility: CLINIC | Age: 48
End: 2023-11-03
Payer: COMMERCIAL

## 2023-11-03 DIAGNOSIS — R74.8 ELEVATED LIVER ENZYMES: Primary | ICD-10-CM

## 2023-11-03 NOTE — PROGRESS NOTES
Results have been released via [employer]. Please verify that these have been viewed by patient. If not, please call patient with results.     Please schedule the following orders: labs and abdominal US    I have sent a message to them with the following interpretation (see below).    I have reviewed your recent hepatic function panel which remains elevated. Due to these findings, I would like to order some additional labs including a hepatitis panel, iron studies, and an abdominal US. Your BNP is normal indicating no evidence of heart failure.     Please do not hesitate to call or message with any additional questions or concerns.    Olga Curry PA-C

## 2023-11-06 ENCOUNTER — TELEPHONE (OUTPATIENT)
Dept: FAMILY MEDICINE | Facility: CLINIC | Age: 48
End: 2023-11-06
Payer: COMMERCIAL

## 2023-11-06 NOTE — TELEPHONE ENCOUNTER
"----- Message from Cesia Licona sent at 11/6/2023  3:52 PM CST -----  Regarding: please contactpt to explain why she needs ultrasound  I called to schedule her labs and ultrasound order. She agreed to the labs-these are scheduled. She declined to schedule the ultrasound ordered.    Patient states she would like a call back explaining why she needs another ultrasound when she had one done "a while back."    Thanks     "

## 2023-11-06 NOTE — TELEPHONE ENCOUNTER
I do not think this is from Omeprazole. If she had fatty liver seen on US, this could be related to that. Please tell her to focus on a low fat and weight loss diet. We can hold off on the US, but please tell her to get the labs done. We will recheck her liver enzymes at her follow up appointment and consider abdominal US at that time if needed.

## 2023-11-07 ENCOUNTER — LAB VISIT (OUTPATIENT)
Dept: LAB | Facility: HOSPITAL | Age: 48
End: 2023-11-07
Attending: PHYSICIAN ASSISTANT
Payer: COMMERCIAL

## 2023-11-07 DIAGNOSIS — R74.8 ELEVATED LIVER ENZYMES: ICD-10-CM

## 2023-11-07 LAB
FERRITIN SERPL-MCNC: 266 NG/ML (ref 20–300)
HBV CORE AB SERPL QL IA: NORMAL
HBV SURFACE AB SER-ACNC: <3 MIU/ML
HBV SURFACE AB SER-ACNC: NORMAL M[IU]/ML
HBV SURFACE AG SERPL QL IA: NORMAL
HCV AB SERPL QL IA: NORMAL
IRON SERPL-MCNC: 173 UG/DL (ref 30–160)
SATURATED IRON: 39 % (ref 20–50)
TOTAL IRON BINDING CAPACITY: 441 UG/DL (ref 250–450)
TRANSFERRIN SERPL-MCNC: 298 MG/DL (ref 200–375)

## 2023-11-07 PROCEDURE — 84466 ASSAY OF TRANSFERRIN: CPT | Performed by: PHYSICIAN ASSISTANT

## 2023-11-07 PROCEDURE — 86803 HEPATITIS C AB TEST: CPT | Performed by: PHYSICIAN ASSISTANT

## 2023-11-07 PROCEDURE — 82728 ASSAY OF FERRITIN: CPT | Performed by: PHYSICIAN ASSISTANT

## 2023-11-07 PROCEDURE — 83540 ASSAY OF IRON: CPT | Performed by: PHYSICIAN ASSISTANT

## 2023-11-07 PROCEDURE — 36415 COLL VENOUS BLD VENIPUNCTURE: CPT | Mod: PO | Performed by: PHYSICIAN ASSISTANT

## 2023-11-07 PROCEDURE — 86706 HEP B SURFACE ANTIBODY: CPT | Performed by: PHYSICIAN ASSISTANT

## 2023-11-07 PROCEDURE — 87340 HEPATITIS B SURFACE AG IA: CPT | Performed by: PHYSICIAN ASSISTANT

## 2023-11-07 PROCEDURE — 86704 HEP B CORE ANTIBODY TOTAL: CPT | Performed by: PHYSICIAN ASSISTANT

## 2023-11-08 NOTE — PROGRESS NOTES
Results have been released via [employer]. Please verify that these have been viewed by patient. If not, please call patient with results.     I have sent a message to them with the following interpretation (see below).    I have reviewed your recent blood work.     Iron, ferritin, and hepatitis panel are normal. We will continue to monitor your liver enzymes.    Please do not hesitate to call or message with any additional questions or concerns.    Olga Curry PA-C

## 2024-01-17 ENCOUNTER — PATIENT MESSAGE (OUTPATIENT)
Dept: FAMILY MEDICINE | Facility: CLINIC | Age: 49
End: 2024-01-17
Payer: COMMERCIAL

## 2024-01-17 ENCOUNTER — CLINICAL SUPPORT (OUTPATIENT)
Dept: PRIMARY CARE CLINIC | Facility: CLINIC | Age: 49
End: 2024-01-17

## 2024-01-17 DIAGNOSIS — Z78.9 VARICELLA VACCINATION STATUS UNKNOWN: ICD-10-CM

## 2024-01-17 DIAGNOSIS — I10 PRIMARY HYPERTENSION: ICD-10-CM

## 2024-01-17 DIAGNOSIS — Z11.1 SCREENING-PULMONARY TB: Primary | ICD-10-CM

## 2024-01-17 DIAGNOSIS — B00.1 FEVER BLISTER: Primary | ICD-10-CM

## 2024-01-17 DIAGNOSIS — Z02.83 ENCOUNTER FOR DRUG SCREENING: ICD-10-CM

## 2024-01-17 PROCEDURE — 86481 TB AG RESPONSE T-CELL SUSP: CPT | Performed by: NURSE PRACTITIONER

## 2024-01-17 PROCEDURE — 36415 COLL VENOUS BLD VENIPUNCTURE: CPT | Mod: ,,, | Performed by: NURSE PRACTITIONER

## 2024-01-17 PROCEDURE — 86787 VARICELLA-ZOSTER ANTIBODY: CPT | Performed by: NURSE PRACTITIONER

## 2024-01-17 PROCEDURE — 99000 SPECIMEN HANDLING OFFICE-LAB: CPT | Mod: ,,, | Performed by: NURSE PRACTITIONER

## 2024-01-17 RX ORDER — VALACYCLOVIR HYDROCHLORIDE 500 MG/1
500 TABLET, FILM COATED ORAL 2 TIMES DAILY
Qty: 60 TABLET | Refills: 2 | Status: SHIPPED | OUTPATIENT
Start: 2024-01-17 | End: 2024-01-24 | Stop reason: SDUPTHER

## 2024-01-17 RX ORDER — METOPROLOL SUCCINATE 25 MG/1
25 TABLET, EXTENDED RELEASE ORAL DAILY
Qty: 30 TABLET | Refills: 11 | Status: SHIPPED | OUTPATIENT
Start: 2024-01-17 | End: 2024-01-24 | Stop reason: SDUPTHER

## 2024-01-17 NOTE — TELEPHONE ENCOUNTER
Please let patient know that I sent in new Rx for Metoprolol XL 25 mg once daily to take in addition to Amlodipine and Lisinopril. Also sent in Rx for Valtrex. Please schedule follow up in 1-2 weeks for BP check and we can make further adjustments if needed at that time.     I have signed for the following orders AND/OR meds.  Please call the patient and ask the patient to schedule the testing AND/OR inform about any medications that were sent. Medications have been sent to pharmacy listed below      No orders of the defined types were placed in this encounter.      Medications Ordered This Encounter   Medications    metoprolol succinate (TOPROL-XL) 25 MG 24 hr tablet     Sig: Take 1 tablet (25 mg total) by mouth once daily.     Dispense:  30 tablet     Refill:  11    valACYclovir (VALTREX) 500 MG tablet     Sig: Take 1 tablet (500 mg total) by mouth 2 (two) times daily.     Dispense:  60 tablet     Refill:  2         [facility] [id number] [facility] [address]  Phone: [phone] Fax: [phone]    [id number] - [place], [place] - [address] AT [facility] OF [address] [address]  [address] [facility] [address]  Phone: [phone] Fax: [phone]    [id number] - [place], [place] - [address] AT [facility] OF [address]  [address]  [facility] [address]  Phone: [phone] Fax: [phone]    [id number] [address] [address]  Phone: [phone] Fax: [phone]

## 2024-01-17 NOTE — PROGRESS NOTES
Subjective     Patient ID: Mckenzie Barrientos is a 48 y.o. female.    Chief Complaint: No chief complaint on file.    HPI  Review of Systems       Objective     Physical Exam       Assessment and Plan     1. Screening-pulmonary TB  -     Quantiferon Gold TB    2. Varicella vaccination status unknown  -     Varicella Zoster Antibody, IgG    3. Encounter for drug screening        DS and lab work.          No follow-ups on file.

## 2024-01-19 LAB
GAMMA INTERFERON BACKGROUND BLD IA-ACNC: 0.02 [IU]/ML
M TB IFN-G CD4+ BCKGRND COR BLD-ACNC: 0.01 [IU]/ML
MITOGEN IGNF BCKGRD COR BLD-ACNC: 8.88 [IU]/ML
QUANTIFERON-TB GOLD PLUS RESULT: NEGATIVE
TB2 AG MINUS NIL RESULT: 0.01

## 2024-01-24 LAB
VARICELLA ZOSTER, BLOOD: POSITIVE
VZV IGG INDEX: >3.5 (ref 0–0.9)

## 2024-01-24 RX ORDER — METOPROLOL SUCCINATE 25 MG/1
25 TABLET, EXTENDED RELEASE ORAL DAILY
Qty: 30 TABLET | Refills: 11 | Status: SHIPPED | OUTPATIENT
Start: 2024-01-24 | End: 2025-01-23

## 2024-01-24 RX ORDER — VALACYCLOVIR HYDROCHLORIDE 500 MG/1
500 TABLET, FILM COATED ORAL 2 TIMES DAILY
Qty: 60 TABLET | Refills: 2 | Status: SHIPPED | OUTPATIENT
Start: 2024-01-24 | End: 2024-04-23

## 2024-01-29 ENCOUNTER — OFFICE VISIT (OUTPATIENT)
Dept: FAMILY MEDICINE | Facility: CLINIC | Age: 49
End: 2024-01-29
Payer: COMMERCIAL

## 2024-01-29 VITALS
HEART RATE: 66 BPM | SYSTOLIC BLOOD PRESSURE: 123 MMHG | RESPIRATION RATE: 18 BRPM | BODY MASS INDEX: 37.23 KG/M2 | DIASTOLIC BLOOD PRESSURE: 77 MMHG | HEIGHT: 63 IN | WEIGHT: 210.13 LBS

## 2024-01-29 DIAGNOSIS — I10 PRIMARY HYPERTENSION: Primary | ICD-10-CM

## 2024-01-29 PROCEDURE — 4010F ACE/ARB THERAPY RXD/TAKEN: CPT | Mod: CPTII,S$GLB,, | Performed by: PHYSICIAN ASSISTANT

## 2024-01-29 PROCEDURE — 3078F DIAST BP <80 MM HG: CPT | Mod: CPTII,S$GLB,, | Performed by: PHYSICIAN ASSISTANT

## 2024-01-29 PROCEDURE — 99999 PR PBB SHADOW E&M-EST. PATIENT-LVL IV: CPT | Mod: PBBFAC,,, | Performed by: PHYSICIAN ASSISTANT

## 2024-01-29 PROCEDURE — 3008F BODY MASS INDEX DOCD: CPT | Mod: CPTII,S$GLB,, | Performed by: PHYSICIAN ASSISTANT

## 2024-01-29 PROCEDURE — 1160F RVW MEDS BY RX/DR IN RCRD: CPT | Mod: CPTII,S$GLB,, | Performed by: PHYSICIAN ASSISTANT

## 2024-01-29 PROCEDURE — 1159F MED LIST DOCD IN RCRD: CPT | Mod: CPTII,S$GLB,, | Performed by: PHYSICIAN ASSISTANT

## 2024-01-29 PROCEDURE — 3074F SYST BP LT 130 MM HG: CPT | Mod: CPTII,S$GLB,, | Performed by: PHYSICIAN ASSISTANT

## 2024-01-29 PROCEDURE — 99213 OFFICE O/P EST LOW 20 MIN: CPT | Mod: S$GLB,,, | Performed by: PHYSICIAN ASSISTANT

## 2024-01-29 NOTE — PATIENT INSTRUCTIONS
Derrick Rincon,     If you are due for any health screening(s) below please notify me so we can arrange them to be ordered and scheduled. Most healthy patients at your age complete them, but you are free to accept or refuse.     If you can't do it, I'll definitely understand. If you can, I'd certainly appreciate it!    Tests to Keep You Healthy    Mammogram: Met on 11/30/2023  Colon Cancer Screening: DUE  Last Blood Pressure <= 139/89 (1/29/2024): Yes      Its time for your colon cancer screening     Colorectal cancer is one of the leading causes of cancer death for men and women but it doesnt have to be. Screenings can prevent colorectal cancer or find it early enough to treat and cure the disease.     Our records indicate that you may be overdue for colon cancer screening. A colonoscopy or stool screening test can help identify patients at risk for developing colon cancer. Cancer screenings save lives, so schedule yours today to stay healthy.     A colonoscopy is the preferred test for detecting colon cancer. It is needed only once every 10 years if results are negative. While you are sedated, a flexible, lighted tube with a tiny camera is inserted into the rectum and advanced through the colon to look for cancers.     An alternative screening test that is used at home and returned to the lab may also be used. It detects hidden blood in bowel movements which could indicate cancer in the colon. If results are positive, you will need a colonoscopy to determine if the blood is a sign of cancer. This type of follow up (diagnostic) colonoscopy usually requires additional copays as required by your insurance provider.     If you recently had your colon cancer screening performed outside of [facility], please let your Health care team know so that they can update your health record. Please contact your PCP if you have any questions.    Were here to help you quit smoking     Our records indicated that you are  still smoking. One of the best things you can do for your health is to stop smoking and we are here to help.     Talk with your provider about our Smoking Cessation Program and how we can support you on your journey.

## 2024-01-29 NOTE — PROGRESS NOTES
Assessment/Plan:    Problem List Items Addressed This Visit          Cardiac/Vascular    Primary hypertension - Primary    Overview     Hypertension Medications               furosemide (LASIX) 20 MG tablet Take 1 tablet (20 mg total) by mouth daily as needed (leg swelling).    lisinopriL (PRINIVIL,ZESTRIL) 20 MG tablet Take 1 tablet (20 mg total) by mouth once daily.    metoprolol succinate (TOPROL-XL) 25 MG 24 hr tablet Take 1 tablet (25 mg total) by mouth once daily.    amLODIPine (NORVASC) 10 MG tablet Take 1 tablet (10 mg total) by mouth once daily.        -at goal today  -recently added Toprol due to elevated home BP readings  -previous AE of LE edema with Amlodipine, but this has improved  -will continue to monitor BP; she will send updated home readings in 1-2 weeks  -continue lifestyle modification with low sodium diet and exercise   -discussed hypertension disease course and importance of treating high blood pressure  -patient understood and advised of risk of untreated blood pressure. ER precautions were given   for symptoms of hypertensive urgency and emergency.            Follow up in 3 months (on 4/29/2024), or if symptoms worsen or fail to improve.    Olga Curry PA-C  _____________________________________________________________________________________________________________________________________________________    CC: BP check    HPI: Patient is in clinic today as an established patient here for BP check. The patient has a diagnosis of hypertension and the blood pressure has been high on recent checks. Patient was recently started on Toprol XL 25 mg QD due to elevated home BP readings. BP at goal today and has been improving since adding the medication. She remains on Amlodipine 10 mg QD and Lisinopril 20 mg QD. She was having AE of LE edema with Amlodipine, but this has improved. She denies any other adverse effects. The patient has had no headache, vision changes, chest pain, shortness of  breath, dizziness, palpitations. Denies any identifiable exacerbating or relieving factors. No other complaints today.    Past Medical History:   Diagnosis Date    Acid reflux 3/1/2016    Hypertension     Kidney stones     kidney stone    Migraines     Obesity 3/1/2016    Ovarian torsion, acquired 9/27/2019    Tobacco abuse 3/1/2016     Past Surgical History:   Procedure Laterality Date    DIAGNOSTIC LAPAROSCOPY WITH USE OF LASER N/A 09/27/2019    Procedure: LAPAROSCOPY, DIAGNOSTIC;  Surgeon: Fab Espitia MD;  Location: [facility] OR;  Service: OB/GYN;  Laterality: N/A;    HYSTERECTOMY  2014    Partial    LAPAROSCOPIC LYSIS OF ADHESIONS N/A 09/27/2019    Procedure: LYSIS, ADHESIONS, LAPAROSCOPIC;  Surgeon: Fab Espitia MD;  Location: [facility] OR;  Service: OB/GYN;  Laterality: N/A;    LAPAROSCOPIC SALPINGO-OOPHORECTOMY Bilateral 09/27/2019    Procedure: SALPINGO-OOPHORECTOMY, LAPAROSCOPIC;  Surgeon: Fab Espitia MD;  Location: [facility] OR;  Service: OB/GYN;  Laterality: Bilateral;    laproscopy      LIPOMA RESECTION      Right Inner thigh    OOPHORECTOMY Bilateral 2019     Review of patient's allergies indicates:   Allergen Reactions    Epinephrine Palpitations    Procaine Palpitations     Other reaction(s): Heart Palations, Heart Palations    Hydrochlorothiazide Anxiety     Other reaction(s): feels like she is crawling out of her skin, feels like she is crawling out of her skin, feels like she is crawling out of her skin     Social History     Tobacco Use    Smoking status: Every Day     Types: Vaping with nicotine    Smokeless tobacco: Never    Tobacco comments:     Former cigarette smoker, 20 pack years.    Substance Use Topics    Alcohol use: Yes     Alcohol/week: 4.0 standard drinks of alcohol     Types: 4 Drinks containing 0.5 oz of alcohol per week     Comment: rarely: cocktails    Drug use: No     Family History   Problem Relation Age of Onset    Leukemia Mother     Lung cancer Father     Diabetes  Father     Cancer Father         Lung ca    Breast cancer Sister 41    Cancer Sister         Breast ca     Current Outpatient Medications on File Prior to Visit   Medication Sig Dispense Refill    DULoxetine (CYMBALTA) 30 MG capsule Take 1 capsule (30 mg total) by mouth once daily. 90 capsule 3    fluticasone propionate (FLONASE) 50 mcg/actuation nasal spray       furosemide (LASIX) 20 MG tablet Take 1 tablet (20 mg total) by mouth daily as needed (leg swelling). 30 tablet 2    lisinopriL (PRINIVIL,ZESTRIL) 20 MG tablet Take 1 tablet (20 mg total) by mouth once daily. 30 tablet 11    metaxalone (SKELAXIN) 800 MG tablet metaxalone 800 mg tablet   TAKE 1 TABLET BY MOUTH 3 TIMES DAILY AS NEEDED FOR PAIN      metoprolol succinate (TOPROL-XL) 25 MG 24 hr tablet Take 1 tablet (25 mg total) by mouth once daily. 30 tablet 11    omeprazole (PRILOSEC) 20 MG capsule Take 1 capsule by mouth once daily.      tiZANidine (ZANAFLEX) 2 MG tablet TAKE 1 TABLET BY MOUTH 1 TO 2 HOURS BEFORE BEDTIME AS NEEDED FOR MUSCLE PAIN OR HEADACHE 30 tablet 5    valACYclovir (VALTREX) 500 MG tablet Take 1 tablet (500 mg total) by mouth 2 (two) times daily. 60 tablet 2    amLODIPine (NORVASC) 10 MG tablet Take 1 tablet (10 mg total) by mouth once daily. 30 tablet 11    [DISCONTINUED] ergocalciferol (ERGOCALCIFEROL) 50,000 unit Cap        No current facility-administered medications on file prior to visit.       Review of Systems   Constitutional:  Negative for chills, diaphoresis, fatigue and fever.   HENT:  Negative for congestion, ear pain, postnasal drip, sinus pain and sore throat.    Eyes:  Negative for pain and redness.   Respiratory:  Negative for cough, chest tightness and shortness of breath.    Cardiovascular:  Negative for chest pain and leg swelling.   Gastrointestinal:  Negative for abdominal pain, constipation, diarrhea, nausea and vomiting.   Genitourinary:  Negative for dysuria and hematuria.   Musculoskeletal:  Negative for  "arthralgias and joint swelling.   Skin:  Negative for rash.   Neurological:  Negative for dizziness, syncope and headaches.   Psychiatric/Behavioral:  Negative for dysphoric mood. The patient is not nervous/anxious.        Vitals:    01/29/24 0852   BP: 123/77   BP Location: Right arm   Patient Position: Sitting   Pulse: 66   Resp: 18   Weight: 95.3 kg (210 lb 1.6 oz)   Height: 5' 3" (1.6 m)       Wt Readings from Last 3 Encounters:   01/29/24 95.3 kg (210 lb 1.6 oz)   11/30/23 95.7 kg (211 lb)   11/02/23 95.8 kg (211 lb 1.6 oz)       Physical Exam  Constitutional:       General: She is not in acute distress.     Appearance: Normal appearance. She is well-developed.   HENT:      Head: Normocephalic and atraumatic.   Eyes:      Conjunctiva/sclera: Conjunctivae normal.   Cardiovascular:      Rate and Rhythm: Normal rate and regular rhythm.      Pulses: Normal pulses.      Heart sounds: Normal heart sounds. No murmur heard.  Pulmonary:      Effort: Pulmonary effort is normal. No respiratory distress.      Breath sounds: Normal breath sounds.   Abdominal:      General: Bowel sounds are normal. There is no distension.      Palpations: Abdomen is soft.      Tenderness: There is no abdominal tenderness.   Musculoskeletal:         General: Normal range of motion.      Cervical back: Normal range of motion and neck supple.   Skin:     General: Skin is warm and dry.      Findings: No rash.   Neurological:      General: No focal deficit present.      Mental Status: She is alert and oriented to person, place, and time.   Psychiatric:         Mood and Affect: Mood normal.         Behavior: Behavior normal.         Health Maintenance   Topic Date Due    Colorectal Cancer Screening  Never done    Mammogram  11/30/2024    TETANUS VACCINE  01/25/2028    Lipid Panel  10/24/2028    Hepatitis C Screening  Completed     "

## 2024-04-03 ENCOUNTER — PATIENT MESSAGE (OUTPATIENT)
Dept: PULMONOLOGY | Facility: CLINIC | Age: 49
End: 2024-04-03
Payer: COMMERCIAL

## 2024-05-15 ENCOUNTER — OFFICE VISIT (OUTPATIENT)
Dept: PRIMARY CARE CLINIC | Facility: CLINIC | Age: 49
End: 2024-05-15
Payer: COMMERCIAL

## 2024-05-15 ENCOUNTER — PATIENT MESSAGE (OUTPATIENT)
Dept: FAMILY MEDICINE | Facility: CLINIC | Age: 49
End: 2024-05-15
Payer: COMMERCIAL

## 2024-05-15 DIAGNOSIS — L03.115 CELLULITIS OF RIGHT LOWER EXTREMITY: Primary | ICD-10-CM

## 2024-05-15 DIAGNOSIS — T36.95XA ANTIBIOTIC-INDUCED YEAST INFECTION: ICD-10-CM

## 2024-05-15 DIAGNOSIS — B37.9 ANTIBIOTIC-INDUCED YEAST INFECTION: ICD-10-CM

## 2024-05-15 PROCEDURE — 99214 OFFICE O/P EST MOD 30 MIN: CPT | Mod: 95,,, | Performed by: INTERNAL MEDICINE

## 2024-05-15 PROCEDURE — 1159F MED LIST DOCD IN RCRD: CPT | Mod: CPTII,95,, | Performed by: INTERNAL MEDICINE

## 2024-05-15 PROCEDURE — 1160F RVW MEDS BY RX/DR IN RCRD: CPT | Mod: CPTII,95,, | Performed by: INTERNAL MEDICINE

## 2024-05-15 PROCEDURE — 4010F ACE/ARB THERAPY RXD/TAKEN: CPT | Mod: CPTII,95,, | Performed by: INTERNAL MEDICINE

## 2024-05-15 RX ORDER — SULFAMETHOXAZOLE AND TRIMETHOPRIM 800; 160 MG/1; MG/1
1 TABLET ORAL 2 TIMES DAILY
Qty: 14 TABLET | Refills: 0 | Status: SHIPPED | OUTPATIENT
Start: 2024-05-15 | End: 2024-05-21 | Stop reason: SDUPTHER

## 2024-05-15 RX ORDER — MUPIROCIN 20 MG/G
OINTMENT TOPICAL 2 TIMES DAILY
Qty: 30 G | Refills: 1 | Status: SHIPPED | OUTPATIENT
Start: 2024-05-15 | End: 2024-05-25

## 2024-05-15 NOTE — PROGRESS NOTES
Primary Care Telemedicine Note  The patient location is:  Patient Home   The chief complaint leading to consultation is: cellulitis  Total time spent with patient: 10 min    Visit type: Virtual visit with synchronous audio and video  Each patient to whom he or she provides medical services by telemedicine is:  (1) informed of the relationship between the physician and patient and the respective role of any other health care provider with respect to management of the patient; and (2) notified that he or she may decline to receive medical services by telemedicine and may withdraw from such care at any time.    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    Violeta Tellez MD  _____________________________________________________________________________________________________________________________________________________    HPI:    Patient is an established patient who presents today via virtual visit.    History of Present Illness  The patient presents via virtual visit for evaluation of cellulitis on her right leg.    The patient suspects she may have cellulitis of her right leg, a condition she developed after assisting his mother with household chores on Sunday. She fell while walking down concrete steps. Fall caused a large abrasion to her R distal leg. She reports the wound resembles road rash  She has been managing the condition by keeping it covered, cleaned, and applying Bactroban. However,  last night she noticed worsening redness, warmth, swelling and some weeping from the open areas. The discharge is described as milky. She denies fever. She reports symptoms progressing, worse today compared to yesterday.     No other new complaints today.      Past Medical History:  Past Medical History:   Diagnosis Date    Acid reflux 3/1/2016    Hypertension     Kidney stones     kidney stone    Migraines     Obesity 3/1/2016    Ovarian torsion, acquired 9/27/2019    Tobacco abuse 3/1/2016       Current Outpatient Medications on File Prior to Visit   Medication Sig Dispense Refill    amLODIPine (NORVASC) 10 MG tablet Take 1 tablet (10 mg total) by mouth once daily. 30 tablet 11    DULoxetine (CYMBALTA) 30 MG capsule Take 1 capsule (30 mg total) by mouth once daily. 90 capsule 3    fluticasone propionate (FLONASE) 50 mcg/actuation nasal spray       furosemide (LASIX) 20 MG tablet Take 1 tablet (20 mg total) by mouth daily as needed (leg swelling). 30 tablet 2    lisinopriL (PRINIVIL,ZESTRIL) 20 MG tablet Take 1 tablet (20 mg total) by mouth once daily. 30 tablet 11    metaxalone (SKELAXIN) 800 MG tablet metaxalone 800 mg tablet   TAKE 1 TABLET BY MOUTH 3 TIMES DAILY AS NEEDED FOR PAIN      metoprolol succinate (TOPROL-XL) 25 MG 24 hr tablet Take 1 tablet (25 mg total) by mouth once daily. 30 tablet 11    omeprazole (PRILOSEC) 20 MG capsule Take 1 capsule by mouth once daily.      tiZANidine (ZANAFLEX) 2 MG tablet TAKE 1 TABLET BY MOUTH 1 TO 2 HOURS BEFORE BEDTIME AS NEEDED FOR MUSCLE PAIN OR HEADACHE 30 tablet 5    valACYclovir (VALTREX) 500 MG tablet Take 1 tablet (500 mg total) by mouth 2 (two) times daily. 60 tablet 2     No current facility-administered medications on file prior to visit.       Review of Systems   Constitutional:  Negative for chills, fever and malaise/fatigue.   HENT:  Negative for hearing loss.    Eyes:  Negative for discharge.   Respiratory:  Negative for cough, shortness of breath and  wheezing.    Cardiovascular:  Negative for chest pain, palpitations and leg swelling.   Gastrointestinal:  Negative for abdominal pain, blood in stool, constipation, diarrhea and vomiting.   Genitourinary:  Negative for dysuria, frequency and hematuria.   Musculoskeletal:  Negative for back pain, joint pain and neck pain.   Skin:         Wound RLE   Neurological:  Negative for weakness and headaches.   Endo/Heme/Allergies:  Negative for polydipsia.   Psychiatric/Behavioral:  Negative for depression. The patient is not nervous/anxious.          Physical Exam        No data to display                   No data to display                       No data to display                Physical Exam  Constitutional:       General: She is not in acute distress.     Appearance: She is well-developed.   HENT:      Head: Normocephalic and atraumatic.   Pulmonary:      Effort: Pulmonary effort is normal. No respiratory distress.   Abdominal:      General: There is no distension.   Musculoskeletal:         General: Normal range of motion.      Cervical back: Normal range of motion.   Skin:     Comments: Multiple abrasions located at anterior distal RLE with surrounding erythema and swelling. Some possible purulence noted at one open area of wound   Neurological:      Mental Status: She is alert and oriented to person, place, and time.   Psychiatric:         Mood and Affect: Mood normal.         Behavior: Behavior normal.         The patient's Health Maintenance was reviewed and the following appears to be due at this time:  Health Maintenance Due   Topic Date Due    HIV Screening  Never done    Pneumococcal Vaccines (Age 0-64) (2 of 2 - PPSV23 or PCV20) 04/11/2019    Colorectal Cancer Screening  Never done    COVID-19 Vaccine (3 - 2023-24 season) 09/01/2023       DISCLAIMER: This note was compiled by using a speech recognition dictation system and therefore please be aware that typographical / speech recognition errors can and do  occur.  Please contact me if you see any errors specifically.  Consent was obtained for CARMEL[employer] recording system prior to the visit.  Answers submitted by the patient for this visit:  Review of Systems Questionnaire (Submitted on 5/15/2024)  activity change: No  unexpected weight change: No  rhinorrhea: No  trouble swallowing: No  visual disturbance: No  chest tightness: No  polyuria: No  difficulty urinating: No  menstrual problem: No  joint swelling: No  arthralgias: No  confusion: No  dysphoric mood: No

## 2024-05-21 RX ORDER — SULFAMETHOXAZOLE AND TRIMETHOPRIM 800; 160 MG/1; MG/1
1 TABLET ORAL 2 TIMES DAILY
Qty: 6 TABLET | Refills: 0 | Status: SHIPPED | OUTPATIENT
Start: 2024-05-21 | End: 2024-05-24

## 2024-05-21 RX ORDER — SULFAMETHOXAZOLE AND TRIMETHOPRIM 800; 160 MG/1; MG/1
1 TABLET ORAL 2 TIMES DAILY
Qty: 6 TABLET | Refills: 0 | Status: SHIPPED | OUTPATIENT
Start: 2024-05-21 | End: 2024-05-21 | Stop reason: SDUPTHER

## 2024-05-21 NOTE — TELEPHONE ENCOUNTER
I have signed for the following orders AND/OR meds.  Please call the patient and ask the patient to schedule the testing AND/OR inform about any medications that were sent. Medications have been sent to pharmacy listed below      No orders of the defined types were placed in this encounter.      Medications Ordered This Encounter   Medications    sulfamethoxazole-trimethoprim 800-160mg (BACTRIM DS) 800-160 mg Tab     Sig: Take 1 tablet by mouth 2 (two) times daily. for 3 days     Dispense:  6 tablet     Refill:  0         [facility]. - [facility] [id number] [facility] [address]  Phone: [phone] Fax: [phone]    [facility] #[id number] - [place], [place] - [address] AT [facility] OF [address] [address]  [address] [facility] [address]  Phone: [phone] Fax: [phone]    [id number] - [place], [place] - [address] AT [facility] OF [address]  [address]  [facility] [address]  Phone: [phone] Fax: [phone]    [id number] - [facility] [address] [facility] AT [facility] & [address] [place] [address]  [address] [place] [address]  Phone: [phone] Fax: [phone]    [facility] #[id number] - [place] - [address] [facility] AT [facility] & [address]  [place] [address]  Phone: [phone] Fax: [phone]

## 2024-05-24 RX ORDER — DOXYCYCLINE 100 MG/1
100 CAPSULE ORAL 2 TIMES DAILY
Qty: 20 CAPSULE | Refills: 0 | Status: SHIPPED | OUTPATIENT
Start: 2024-05-24 | End: 2024-06-03

## 2024-05-24 RX ORDER — FLUCONAZOLE 150 MG/1
150 TABLET ORAL
Qty: 2 TABLET | Refills: 0 | Status: SHIPPED | OUTPATIENT
Start: 2024-05-24 | End: 2024-05-28

## 2024-05-24 NOTE — TELEPHONE ENCOUNTER
I have signed for the following orders AND/OR meds.  Please call the patient and ask the patient to schedule the testing AND/OR inform about any medications that were sent. Medications have been sent to pharmacy listed below      No orders of the defined types were placed in this encounter.      Medications Ordered This Encounter   Medications    doxycycline (VIBRAMYCIN) 100 MG Cap     Sig: Take 1 capsule (100 mg total) by mouth 2 (two) times daily. for 10 days     Dispense:  20 capsule     Refill:  0    sulfamethoxazole-trimethoprim 800-160mg (BACTRIM DS) 800-160 mg Tab     Sig: Take 1 tablet by mouth 2 (two) times daily. for 3 days     Dispense:  6 tablet     Refill:  0         [id number] - [facility] [place] [place] - [address] AT [facility] OF [address]  [address] [address]  Phone: [phone] Fax: [phone]

## 2024-05-31 RX ORDER — FLUCONAZOLE 150 MG/1
150 TABLET ORAL
Qty: 2 TABLET | Refills: 0 | Status: SHIPPED | OUTPATIENT
Start: 2024-05-31 | End: 2024-06-04

## 2024-05-31 NOTE — TELEPHONE ENCOUNTER
I have signed for the following orders AND/OR meds.  Please call the patient and ask the patient to schedule the testing AND/OR inform about any medications that were sent. Medications have been sent to pharmacy listed below      No orders of the defined types were placed in this encounter.      Medications Ordered This Encounter   Medications    doxycycline (VIBRAMYCIN) 100 MG Cap     Sig: Take 1 capsule (100 mg total) by mouth 2 (two) times daily. for 10 days     Dispense:  20 capsule     Refill:  0    fluconazole (DIFLUCAN) 150 MG Tab     Sig: Take 1 tablet (150 mg total) by mouth every 72 hours. for 2 doses     Dispense:  2 tablet     Refill:  0    fluconazole (DIFLUCAN) 150 MG Tab     Sig: Take 1 tablet (150 mg total) by mouth every 72 hours. for 2 doses     Dispense:  2 tablet     Refill:  0    sulfamethoxazole-trimethoprim 800-160mg (BACTRIM DS) 800-160 mg Tab     Sig: Take 1 tablet by mouth 2 (two) times daily. for 3 days     Dispense:  6 tablet     Refill:  0         [id number] - [place] - [address] AT [facility] OF [address]  [address] [place] [address]  Phone: [phone] Fax: [phone]

## 2024-06-13 ENCOUNTER — PATIENT MESSAGE (OUTPATIENT)
Dept: PRIMARY CARE CLINIC | Facility: CLINIC | Age: 49
End: 2024-06-13
Payer: COMMERCIAL

## 2024-06-13 DIAGNOSIS — R21 RASH AND NONSPECIFIC SKIN ERUPTION: Primary | ICD-10-CM

## 2024-06-14 RX ORDER — TRIAMCINOLONE ACETONIDE 1 MG/G
CREAM TOPICAL 2 TIMES DAILY
Qty: 80 G | Refills: 0 | Status: SHIPPED | OUTPATIENT
Start: 2024-06-14 | End: 2025-06-14

## 2024-06-14 NOTE — TELEPHONE ENCOUNTER
Please let patient know that I have sent a topical steroid to help with her rash.     I have signed for the following orders AND/OR meds.  Please call the patient and ask the patient to schedule the testing AND/OR inform about any medications that were sent. Medications have been sent to pharmacy listed below      No orders of the defined types were placed in this encounter.      Medications Ordered This Encounter   Medications    triamcinolone acetonide 0.1% (KENALOG) 0.1 % cream     Sig: Apply topically 2 (two) times daily.     Dispense:  80 g     Refill:  0         [id number] - [place], [place] - [address] AT [facility] OF [address]  [address]  [facility] [address]  Phone: [phone] Fax: [phone]

## 2024-06-25 ENCOUNTER — PATIENT MESSAGE (OUTPATIENT)
Dept: FAMILY MEDICINE | Facility: CLINIC | Age: 49
End: 2024-06-25
Payer: COMMERCIAL

## 2024-06-26 ENCOUNTER — OFFICE VISIT (OUTPATIENT)
Dept: FAMILY MEDICINE | Facility: CLINIC | Age: 49
End: 2024-06-26
Payer: COMMERCIAL

## 2024-06-26 VITALS
WEIGHT: 210 LBS | HEIGHT: 63 IN | SYSTOLIC BLOOD PRESSURE: 129 MMHG | DIASTOLIC BLOOD PRESSURE: 63 MMHG | BODY MASS INDEX: 37.21 KG/M2

## 2024-06-26 DIAGNOSIS — I10 PRIMARY HYPERTENSION: Primary | ICD-10-CM

## 2024-06-26 PROCEDURE — 3008F BODY MASS INDEX DOCD: CPT | Mod: CPTII,95,, | Performed by: PHYSICIAN ASSISTANT

## 2024-06-26 PROCEDURE — 99214 OFFICE O/P EST MOD 30 MIN: CPT | Mod: 95,,, | Performed by: PHYSICIAN ASSISTANT

## 2024-06-26 PROCEDURE — 3074F SYST BP LT 130 MM HG: CPT | Mod: CPTII,95,, | Performed by: PHYSICIAN ASSISTANT

## 2024-06-26 PROCEDURE — 4010F ACE/ARB THERAPY RXD/TAKEN: CPT | Mod: CPTII,95,, | Performed by: PHYSICIAN ASSISTANT

## 2024-06-26 PROCEDURE — 1159F MED LIST DOCD IN RCRD: CPT | Mod: CPTII,95,, | Performed by: PHYSICIAN ASSISTANT

## 2024-06-26 PROCEDURE — 1160F RVW MEDS BY RX/DR IN RCRD: CPT | Mod: CPTII,95,, | Performed by: PHYSICIAN ASSISTANT

## 2024-06-26 PROCEDURE — 3078F DIAST BP <80 MM HG: CPT | Mod: CPTII,95,, | Performed by: PHYSICIAN ASSISTANT

## 2024-06-26 RX ORDER — LISINOPRIL 40 MG/1
40 TABLET ORAL DAILY
Qty: 30 TABLET | Refills: 11 | Status: SHIPPED | OUTPATIENT
Start: 2024-06-26 | End: 2025-06-26

## 2024-06-26 NOTE — PROGRESS NOTES
Primary Care Telemedicine Note  The patient location is: Patient Home   The chief complaint leading to consultation is: Discuss BP medication  Total time spent with patient: 15 minutes    Visit type: Virtual visit with synchronous audio only and video  Each patient to whom he or she provides medical services by telemedicine is: (1) informed of the relationship between the physician and patient and the respective role of any other health care provider with respect to management of the patient; and (2) notified that he or she may decline to receive medical services by telemedicine and may withdraw from such care at any time.      Assessment/Plan:    Problem List Items Addressed This Visit          Cardiac/Vascular    Primary hypertension - Primary    Overview     Hypertension Medications               amLODIPine (NORVASC) 10 MG tablet Take 1 tablet (10 mg total) by mouth once daily.    lisinopriL (PRINIVIL,ZESTRIL) 20 MG tablet Take 1 tablet (20 mg total) by mouth once daily.    metoprolol succinate (TOPROL-XL) 25 MG 24 hr tablet Take 1 tablet (25 mg total) by mouth once daily.    furosemide (LASIX) 20 MG tablet Take 1 tablet (20 mg total) by mouth daily as needed (leg swelling).        -at goal today  -having LE edema w/ Amlodipine  -of note, also had AE w/ HCTZ  -will stop Amlodipine and increase Lisinopril to 40 mg QD  -monitor BP closely at home  -will consider increasing Toprol or adding Chlorthalidone in the future if needed  -continue lifestyle modification with low sodium diet and exercise   -discussed hypertension disease course and importance of treating high blood pressure  -patient understood and advised of risk of untreated blood pressure. ER precautions were given   for symptoms of hypertensive urgency and emergency.         Relevant Medications    lisinopriL (PRINIVIL,ZESTRIL) 40 MG tablet       Follow up in about 2 weeks (around 7/10/2024), or if symptoms worsen or fail to improve, for BP  check.    Olga Curry PA-C  _____________________________________________________________________________________________________________________________________________________    CC: discuss BP medication    HPI: Patient is an established patient who presents today via virtual visit to discuss BP medication. The patient is currently being treated for essential hypertension. This condition is chronic and stable. The patient is tolerating their medication well with good compliance. She has been having LE edema with Amlodipine. She also remains on Lisinopril and Metoprolol. Denies any other adverse effects. Counseling was offered regarding low sodium diet. The patient has a reduced salt intake. Routine exercise recommended. The patient denies headache, vision changes, chest pain, palpitations, shortness of breath, or lower extremity edema. No other complaints today.     Past Medical History:   Diagnosis Date    Acid reflux 3/1/2016    Hypertension     Kidney stones     kidney stone    Migraines     Obesity 3/1/2016    Ovarian torsion, acquired 9/27/2019    Tobacco abuse 3/1/2016     Past Surgical History:   Procedure Laterality Date    DIAGNOSTIC LAPAROSCOPY WITH USE OF LASER N/A 09/27/2019    Procedure: LAPAROSCOPY, DIAGNOSTIC;  Surgeon: Fab Espitia MD;  Location: [facility] OR;  Service: OB/GYN;  Laterality: N/A;    HYSTERECTOMY  2014    Partial    LAPAROSCOPIC LYSIS OF ADHESIONS N/A 09/27/2019    Procedure: LYSIS, ADHESIONS, LAPAROSCOPIC;  Surgeon: Fab Espitia MD;  Location: [facility] OR;  Service: OB/GYN;  Laterality: N/A;    LAPAROSCOPIC SALPINGO-OOPHORECTOMY Bilateral 09/27/2019    Procedure: SALPINGO-OOPHORECTOMY, LAPAROSCOPIC;  Surgeon: Fab Espitia MD;  Location: [facility] OR;  Service: OB/GYN;  Laterality: Bilateral;    laproscopy      LIPOMA RESECTION      Right Inner thigh    OOPHORECTOMY Bilateral 2019     Review of patient's allergies indicates:   Allergen Reactions    Epinephrine  Palpitations    Procaine Palpitations     Other reaction(s): Heart Palations, Heart Palations    Hydrochlorothiazide Anxiety     Other reaction(s): feels like she is crawling out of her skin, feels like she is crawling out of her skin, feels like she is crawling out of her skin     Social History     Tobacco Use    Smoking status: Every Day     Types: Vaping with nicotine    Smokeless tobacco: Never    Tobacco comments:     Former cigarette smoker, 20 pack years.    Substance Use Topics    Alcohol use: Yes     Alcohol/week: 4.0 standard drinks of alcohol     Types: 4 Drinks containing 0.5 oz of alcohol per week     Comment: rarely: cocktails    Drug use: No     Family History   Problem Relation Name Age of Onset    Leukemia Mother      Lung cancer Father Darwin     Diabetes Father Darwin     Cancer Father Darwni         Lung ca    Breast cancer Sister  41    Cancer Sister Leandra         Breast ca     Current Outpatient Medications on File Prior to Visit   Medication Sig Dispense Refill    DULoxetine (CYMBALTA) 30 MG capsule Take 1 capsule (30 mg total) by mouth once daily. 90 capsule 3    fluticasone propionate (FLONASE) 50 mcg/actuation nasal spray       metaxalone (SKELAXIN) 800 MG tablet metaxalone 800 mg tablet   TAKE 1 TABLET BY MOUTH 3 TIMES DAILY AS NEEDED FOR PAIN      metoprolol succinate (TOPROL-XL) 25 MG 24 hr tablet Take 1 tablet (25 mg total) by mouth once daily. 30 tablet 11    omeprazole (PRILOSEC) 20 MG capsule Take 1 capsule by mouth once daily.      tiZANidine (ZANAFLEX) 2 MG tablet TAKE 1 TABLET BY MOUTH 1 TO 2 HOURS BEFORE BEDTIME AS NEEDED FOR MUSCLE PAIN OR HEADACHE 30 tablet 5    triamcinolone acetonide 0.1% (KENALOG) 0.1 % cream Apply topically 2 (two) times daily. 80 g 0    valACYclovir (VALTREX) 500 MG tablet Take 1 tablet (500 mg total) by mouth 2 (two) times daily. 60 tablet 2    [DISCONTINUED] amLODIPine (NORVASC) 10 MG tablet Take 1 tablet (10 mg total) by mouth once daily. 30 tablet 11     [DISCONTINUED] lisinopriL (PRINIVIL,ZESTRIL) 20 MG tablet Take 1 tablet (20 mg total) by mouth once daily. 30 tablet 11    furosemide (LASIX) 20 MG tablet Take 1 tablet (20 mg total) by mouth daily as needed (leg swelling). (Patient not taking: Reported on 6/26/2024) 30 tablet 2     No current facility-administered medications on file prior to visit.       Review of Systems   Constitutional:  Negative for chills, fever and malaise/fatigue.   HENT:  Negative for hearing loss.    Eyes:  Negative for discharge.   Respiratory:  Negative for cough, shortness of breath and wheezing.    Cardiovascular:  Negative for chest pain, palpitations and leg swelling.   Gastrointestinal:  Negative for abdominal pain, blood in stool, constipation, diarrhea and vomiting.   Genitourinary:  Negative for dysuria, frequency and hematuria.   Musculoskeletal:  Negative for back pain, joint pain and neck pain.   Neurological:  Negative for weakness and headaches.   Endo/Heme/Allergies:  Negative for polydipsia.   Psychiatric/Behavioral:  Negative for depression. The patient is not nervous/anxious.          Physical Exam  Constitutional:       General: She is not in acute distress.     Appearance: She is well-developed.   HENT:      Head: Normocephalic and atraumatic.   Pulmonary:      Effort: Pulmonary effort is normal. No respiratory distress.   Abdominal:      General: There is no distension.   Musculoskeletal:         General: Normal range of motion.      Cervical back: Normal range of motion.   Neurological:      Mental Status: She is alert and oriented to person, place, and time.   Psychiatric:         Mood and Affect: Mood normal.         Behavior: Behavior normal.         The patient's Health Maintenance was reviewed and the following appears to be due at this time:  Health Maintenance Due   Topic Date Due    HIV Screening  Never done    Pneumococcal Vaccines (Age 0-64) (2 of 2 - PPSV23 or PCV20) 04/11/2019    Colorectal Cancer  Screening  Never done    COVID-19 Vaccine (3 - 2023-24 season) 09/01/2023

## 2024-06-30 ENCOUNTER — PATIENT MESSAGE (OUTPATIENT)
Dept: FAMILY MEDICINE | Facility: CLINIC | Age: 49
End: 2024-06-30
Payer: COMMERCIAL

## 2024-06-30 DIAGNOSIS — Z11.1 SCREENING FOR TUBERCULOSIS: Primary | ICD-10-CM

## 2024-06-30 DIAGNOSIS — I10 PRIMARY HYPERTENSION: ICD-10-CM

## 2024-07-01 RX ORDER — CHLORTHALIDONE 25 MG/1
25 TABLET ORAL DAILY
Qty: 30 TABLET | Refills: 11 | Status: SHIPPED | OUTPATIENT
Start: 2024-07-01 | End: 2025-07-01

## 2024-07-01 NOTE — TELEPHONE ENCOUNTER
I have signed for the following orders AND/OR meds.  Please call the patient and ask the patient to schedule the testing AND/OR inform about any medications that were sent. Medications have been sent to pharmacy listed below      Orders Placed This Encounter   Procedures    Quantiferon Gold TB     Standing Status:   Future     Standing Expiration Date:   9/29/2025       Medications Ordered This Encounter   Medications    chlorthalidone (HYGROTEN) 25 MG Tab     Sig: Take 1 tablet (25 mg total) by mouth once daily.     Dispense:  30 tablet     Refill:  11     .         [facility] #[id number] - [place], [place] - [address] AT [facility] [address]  [address]  [facility] [address]  Phone: [phone] Fax: [phone]

## 2024-07-02 ENCOUNTER — TELEPHONE (OUTPATIENT)
Dept: FAMILY MEDICINE | Facility: CLINIC | Age: 49
End: 2024-07-02
Payer: COMMERCIAL

## 2024-07-26 ENCOUNTER — PATIENT MESSAGE (OUTPATIENT)
Dept: FAMILY MEDICINE | Facility: CLINIC | Age: 49
End: 2024-07-26
Payer: COMMERCIAL

## 2024-07-26 DIAGNOSIS — R74.8 ELEVATED LIVER ENZYMES: ICD-10-CM

## 2024-07-26 DIAGNOSIS — R73.03 PREDIABETES: ICD-10-CM

## 2024-07-26 DIAGNOSIS — I10 PRIMARY HYPERTENSION: Primary | ICD-10-CM

## 2024-07-26 DIAGNOSIS — E78.5 HYPERLIPIDEMIA, UNSPECIFIED HYPERLIPIDEMIA TYPE: ICD-10-CM

## 2024-07-26 DIAGNOSIS — Z79.899 ENCOUNTER FOR LONG-TERM (CURRENT) USE OF MEDICATIONS: ICD-10-CM

## 2024-07-26 NOTE — TELEPHONE ENCOUNTER
I have signed for the following orders AND/OR meds.  Please call the patient and ask the patient to schedule the testing AND/OR inform about any medications that were sent. Medications have been sent to pharmacy listed below      Orders Placed This Encounter   Procedures    CBC Auto Differential     Standing Status:   Future     Standing Expiration Date:   7/26/2025    Comprehensive Metabolic Panel     Standing Status:   Future     Standing Expiration Date:   7/26/2025    Hemoglobin A1C     Standing Status:   Future     Standing Expiration Date:   7/26/2025    TSH     Standing Status:   Future     Standing Expiration Date:   7/26/2025    Lipid Panel     Standing Status:   Future     Standing Expiration Date:   7/27/2025    Vitamin D     Standing Status:   Future     Standing Expiration Date:   9/24/2025              [facility] #[id number] - [place] - [address] AT [facility] [address]  [address] [place] [address]  Phone: [phone] Fax: [phone]

## 2024-08-12 ENCOUNTER — OFFICE VISIT (OUTPATIENT)
Dept: FAMILY MEDICINE | Facility: CLINIC | Age: 49
End: 2024-08-12
Payer: COMMERCIAL

## 2024-08-12 ENCOUNTER — LAB VISIT (OUTPATIENT)
Dept: LAB | Facility: HOSPITAL | Age: 49
End: 2024-08-12
Payer: COMMERCIAL

## 2024-08-12 VITALS
DIASTOLIC BLOOD PRESSURE: 78 MMHG | HEIGHT: 63 IN | HEART RATE: 60 BPM | WEIGHT: 216 LBS | BODY MASS INDEX: 38.27 KG/M2 | SYSTOLIC BLOOD PRESSURE: 102 MMHG | TEMPERATURE: 98 F

## 2024-08-12 DIAGNOSIS — R73.03 PREDIABETES: ICD-10-CM

## 2024-08-12 DIAGNOSIS — R21 RASH AND NONSPECIFIC SKIN ERUPTION: Primary | ICD-10-CM

## 2024-08-12 DIAGNOSIS — Z79.899 ENCOUNTER FOR LONG-TERM (CURRENT) USE OF MEDICATIONS: ICD-10-CM

## 2024-08-12 DIAGNOSIS — I10 PRIMARY HYPERTENSION: ICD-10-CM

## 2024-08-12 DIAGNOSIS — E78.5 HYPERLIPIDEMIA, UNSPECIFIED HYPERLIPIDEMIA TYPE: ICD-10-CM

## 2024-08-12 DIAGNOSIS — R74.8 ELEVATED LIVER ENZYMES: ICD-10-CM

## 2024-08-12 LAB
25(OH)D3+25(OH)D2 SERPL-MCNC: 43 NG/ML (ref 30–96)
ALBUMIN SERPL BCP-MCNC: 3.7 G/DL (ref 3.5–5.2)
ALP SERPL-CCNC: 73 U/L (ref 55–135)
ALT SERPL W/O P-5'-P-CCNC: 53 U/L (ref 10–44)
ANION GAP SERPL CALC-SCNC: 11 MMOL/L (ref 8–16)
AST SERPL-CCNC: 26 U/L (ref 10–40)
BASOPHILS # BLD AUTO: 0.06 K/UL (ref 0–0.2)
BASOPHILS NFR BLD: 0.7 % (ref 0–1.9)
BILIRUB SERPL-MCNC: 0.4 MG/DL (ref 0.1–1)
BUN SERPL-MCNC: 18 MG/DL (ref 6–20)
CALCIUM SERPL-MCNC: 9.6 MG/DL (ref 8.7–10.5)
CHLORIDE SERPL-SCNC: 102 MMOL/L (ref 95–110)
CHOLEST SERPL-MCNC: 198 MG/DL (ref 120–199)
CHOLEST/HDLC SERPL: 4.7 {RATIO} (ref 2–5)
CO2 SERPL-SCNC: 27 MMOL/L (ref 23–29)
CREAT SERPL-MCNC: 0.9 MG/DL (ref 0.5–1.4)
DIFFERENTIAL METHOD BLD: NORMAL
EOSINOPHIL # BLD AUTO: 0.2 K/UL (ref 0–0.5)
EOSINOPHIL NFR BLD: 2 % (ref 0–8)
ERYTHROCYTE [DISTWIDTH] IN BLOOD BY AUTOMATED COUNT: 12.5 % (ref 11.5–14.5)
EST. GFR  (NO RACE VARIABLE): >60 ML/MIN/1.73 M^2
ESTIMATED AVG GLUCOSE: 137 MG/DL (ref 68–131)
GLUCOSE SERPL-MCNC: 139 MG/DL (ref 70–110)
HBA1C MFR BLD: 6.4 % (ref 4–5.6)
HCT VFR BLD AUTO: 42 % (ref 37–48.5)
HDLC SERPL-MCNC: 42 MG/DL (ref 40–75)
HDLC SERPL: 21.2 % (ref 20–50)
HGB BLD-MCNC: 13.9 G/DL (ref 12–16)
IMM GRANULOCYTES # BLD AUTO: 0.03 K/UL (ref 0–0.04)
IMM GRANULOCYTES NFR BLD AUTO: 0.3 % (ref 0–0.5)
LDLC SERPL CALC-MCNC: 118 MG/DL (ref 63–159)
LYMPHOCYTES # BLD AUTO: 2.8 K/UL (ref 1–4.8)
LYMPHOCYTES NFR BLD: 31.7 % (ref 18–48)
MCH RBC QN AUTO: 30.7 PG (ref 27–31)
MCHC RBC AUTO-ENTMCNC: 33.1 G/DL (ref 32–36)
MCV RBC AUTO: 93 FL (ref 82–98)
MONOCYTES # BLD AUTO: 0.6 K/UL (ref 0.3–1)
MONOCYTES NFR BLD: 6.7 % (ref 4–15)
NEUTROPHILS # BLD AUTO: 5.2 K/UL (ref 1.8–7.7)
NEUTROPHILS NFR BLD: 58.6 % (ref 38–73)
NONHDLC SERPL-MCNC: 156 MG/DL
NRBC BLD-RTO: 0 /100 WBC
PLATELET # BLD AUTO: 268 K/UL (ref 150–450)
PMV BLD AUTO: 10 FL (ref 9.2–12.9)
POTASSIUM SERPL-SCNC: 3.6 MMOL/L (ref 3.5–5.1)
PROT SERPL-MCNC: 6.6 G/DL (ref 6–8.4)
RBC # BLD AUTO: 4.53 M/UL (ref 4–5.4)
SODIUM SERPL-SCNC: 140 MMOL/L (ref 136–145)
TRIGL SERPL-MCNC: 190 MG/DL (ref 30–150)
TSH SERPL DL<=0.005 MIU/L-ACNC: 2.1 UIU/ML (ref 0.4–4)
WBC # BLD AUTO: 8.84 K/UL (ref 3.9–12.7)

## 2024-08-12 PROCEDURE — 84443 ASSAY THYROID STIM HORMONE: CPT | Performed by: PHYSICIAN ASSISTANT

## 2024-08-12 PROCEDURE — 85025 COMPLETE CBC W/AUTO DIFF WBC: CPT | Performed by: PHYSICIAN ASSISTANT

## 2024-08-12 PROCEDURE — 3008F BODY MASS INDEX DOCD: CPT | Mod: CPTII,S$GLB,, | Performed by: PHYSICIAN ASSISTANT

## 2024-08-12 PROCEDURE — 99213 OFFICE O/P EST LOW 20 MIN: CPT | Mod: S$GLB,,, | Performed by: PHYSICIAN ASSISTANT

## 2024-08-12 PROCEDURE — 99999 PR PBB SHADOW E&M-EST. PATIENT-LVL IV: CPT | Mod: PBBFAC,,, | Performed by: PHYSICIAN ASSISTANT

## 2024-08-12 PROCEDURE — 1160F RVW MEDS BY RX/DR IN RCRD: CPT | Mod: CPTII,S$GLB,, | Performed by: PHYSICIAN ASSISTANT

## 2024-08-12 PROCEDURE — 80053 COMPREHEN METABOLIC PANEL: CPT | Performed by: PHYSICIAN ASSISTANT

## 2024-08-12 PROCEDURE — 83036 HEMOGLOBIN GLYCOSYLATED A1C: CPT | Performed by: PHYSICIAN ASSISTANT

## 2024-08-12 PROCEDURE — 4010F ACE/ARB THERAPY RXD/TAKEN: CPT | Mod: CPTII,S$GLB,, | Performed by: PHYSICIAN ASSISTANT

## 2024-08-12 PROCEDURE — 3074F SYST BP LT 130 MM HG: CPT | Mod: CPTII,S$GLB,, | Performed by: PHYSICIAN ASSISTANT

## 2024-08-12 PROCEDURE — 82306 VITAMIN D 25 HYDROXY: CPT | Performed by: PHYSICIAN ASSISTANT

## 2024-08-12 PROCEDURE — 80061 LIPID PANEL: CPT | Performed by: PHYSICIAN ASSISTANT

## 2024-08-12 PROCEDURE — 36415 COLL VENOUS BLD VENIPUNCTURE: CPT | Mod: PO | Performed by: PHYSICIAN ASSISTANT

## 2024-08-12 PROCEDURE — 3078F DIAST BP <80 MM HG: CPT | Mod: CPTII,S$GLB,, | Performed by: PHYSICIAN ASSISTANT

## 2024-08-12 PROCEDURE — 1159F MED LIST DOCD IN RCRD: CPT | Mod: CPTII,S$GLB,, | Performed by: PHYSICIAN ASSISTANT

## 2024-08-12 NOTE — PROGRESS NOTES
Assessment/Plan:    Problem List Items Addressed This Visit    None  Visit Diagnoses       Rash and nonspecific skin eruption    -  Primary        -continue topical steroids as prescribed   -recommend OTC benadryl for itching  -avoid touching/scratching  -consider dermatology referral if symptoms persist  -ER precautions for severe or worsening of symptoms     Follow up if symptoms worsen or fail to improve.    Olga Curry PA-C  _____________________________________________________________________________________________________________________________________________________    CC: rash    HPI: Patient is in clinic today as an established patient here for rash on R lower leg. Rash started about 1 week ago, but has improved since that time. Appearance of rash at onset: small red bumps on R lower leg. She has associated itching. Denies fever or pain. Also denies sore throat, nasal congestion, coughing, abdominal pain, nausea, vomiting, diarrhea, headaches or arthralgias. Patient had similar rash in same location about 2 months ago. She had a wound on her R lower leg after a fall and was using a bandage to cover the wound and developed a rash in the surrounding area which was suspected to be an allergic reaction to the adhesive. Rash was treated with topical steroids and eventually resolved. She reports recurrence of the rash about 1 week ago, however, it is not as severe as previous rash. She has been using the topical steroid (triamcinolone) with some improvement of symptoms. She denies any other recent new exposures (soaps, lotions, laundry detergents, foods, medications, plants, insects or animals). No other complaints today.      Past Medical History:   Diagnosis Date    Acid reflux 3/1/2016    Hypertension     Kidney stones     kidney stone    Migraines     Obesity 3/1/2016    Ovarian torsion, acquired 9/27/2019    Tobacco abuse 3/1/2016     Past Surgical History:   Procedure Laterality Date    DIAGNOSTIC  LAPAROSCOPY WITH USE OF LASER N/A 09/27/2019    Procedure: LAPAROSCOPY, DIAGNOSTIC;  Surgeon: Fab Espitia MD;  Location: [facility] OR;  Service: OB/GYN;  Laterality: N/A;    HYSTERECTOMY  2014    Partial    LAPAROSCOPIC LYSIS OF ADHESIONS N/A 09/27/2019    Procedure: LYSIS, ADHESIONS, LAPAROSCOPIC;  Surgeon: Fab Espitia MD;  Location: [facility] OR;  Service: OB/GYN;  Laterality: N/A;    LAPAROSCOPIC SALPINGO-OOPHORECTOMY Bilateral 09/27/2019    Procedure: SALPINGO-OOPHORECTOMY, LAPAROSCOPIC;  Surgeon: Fab Espitia MD;  Location: [facility] OR;  Service: OB/GYN;  Laterality: Bilateral;    laproscopy      LIPOMA RESECTION      Right Inner thigh    OOPHORECTOMY Bilateral 2019     Review of patient's allergies indicates:   Allergen Reactions    Epinephrine Palpitations    Procaine Palpitations     Other reaction(s): Heart Palations, Heart Palations    Hydrochlorothiazide Anxiety     Other reaction(s): feels like she is crawling out of her skin, feels like she is crawling out of her skin, feels like she is crawling out of her skin     Social History     Tobacco Use    Smoking status: Every Day     Types: Vaping with nicotine    Smokeless tobacco: Never    Tobacco comments:     Former cigarette smoker, 20 pack years.    Substance Use Topics    Alcohol use: Yes     Alcohol/week: 4.0 standard drinks of alcohol     Types: 4 Drinks containing 0.5 oz of alcohol per week     Comment: rarely: cocktails    Drug use: No     Family History   Problem Relation Name Age of Onset    Leukemia Mother      Lung cancer Father Darwin     Diabetes Father Darwin     Cancer Father Darwin         Lung ca    Breast cancer Sister  41    Cancer Sister Leandra         Breast ca     Current Outpatient Medications on File Prior to Visit   Medication Sig Dispense Refill    chlorthalidone (HYGROTEN) 25 MG Tab Take 1 tablet (25 mg total) by mouth once daily. 30 tablet 11    DULoxetine (CYMBALTA) 30 MG capsule Take 1 capsule (30 mg total) by mouth once  daily. 90 capsule 3    fluticasone propionate (FLONASE) 50 mcg/actuation nasal spray       lisinopriL (PRINIVIL,ZESTRIL) 40 MG tablet Take 1 tablet (40 mg total) by mouth once daily. 30 tablet 11    metaxalone (SKELAXIN) 800 MG tablet metaxalone 800 mg tablet   TAKE 1 TABLET BY MOUTH 3 TIMES DAILY AS NEEDED FOR PAIN      metoprolol succinate (TOPROL-XL) 25 MG 24 hr tablet Take 1 tablet (25 mg total) by mouth once daily. 30 tablet 11    omeprazole (PRILOSEC) 20 MG capsule Take 1 capsule by mouth once daily.      tiZANidine (ZANAFLEX) 2 MG tablet TAKE 1 TABLET BY MOUTH 1 TO 2 HOURS BEFORE BEDTIME AS NEEDED FOR MUSCLE PAIN OR HEADACHE 30 tablet 5    triamcinolone acetonide 0.1% (KENALOG) 0.1 % cream Apply topically 2 (two) times daily. 80 g 0    valACYclovir (VALTREX) 500 MG tablet Take 1 tablet (500 mg total) by mouth 2 (two) times daily. 60 tablet 2    furosemide (LASIX) 20 MG tablet Take 1 tablet (20 mg total) by mouth daily as needed (leg swelling). (Patient not taking: Reported on 8/12/2024) 30 tablet 2     No current facility-administered medications on file prior to visit.       Review of Systems   Constitutional:  Negative for chills, diaphoresis, fatigue and fever.   HENT:  Negative for congestion, ear pain, postnasal drip, rhinorrhea, sinus pain and sore throat.    Eyes:  Negative for pain and redness.   Respiratory:  Negative for cough, chest tightness and shortness of breath.    Cardiovascular:  Negative for chest pain and leg swelling.   Gastrointestinal:  Negative for abdominal pain, constipation, diarrhea, nausea and vomiting.   Genitourinary:  Negative for dysuria and hematuria.   Musculoskeletal:  Negative for arthralgias and joint swelling.   Skin:  Positive for rash.   Neurological:  Negative for dizziness, syncope and headaches.   Psychiatric/Behavioral:  Negative for dysphoric mood. The patient is not nervous/anxious.        Vitals:    08/12/24 0819   BP: 102/78   Pulse: 60   Temp: 97.9 °F (36.6  "°C)   Weight: 98 kg (216 lb)   Height: 5' 3" (1.6 m)       Wt Readings from Last 3 Encounters:   08/12/24 98 kg (216 lb)   06/26/24 95.3 kg (210 lb)   01/29/24 95.3 kg (210 lb 1.6 oz)       Physical Exam  Constitutional:       General: She is not in acute distress.     Appearance: Normal appearance. She is well-developed.   HENT:      Head: Normocephalic and atraumatic.   Eyes:      Conjunctiva/sclera: Conjunctivae normal.   Cardiovascular:      Rate and Rhythm: Normal rate and regular rhythm.      Pulses: Normal pulses.      Heart sounds: Normal heart sounds. No murmur heard.  Pulmonary:      Effort: Pulmonary effort is normal. No respiratory distress.      Breath sounds: Normal breath sounds.   Abdominal:      General: Bowel sounds are normal. There is no distension.      Palpations: Abdomen is soft.      Tenderness: There is no abdominal tenderness.   Musculoskeletal:         General: Normal range of motion.      Cervical back: Normal range of motion and neck supple.   Skin:     General: Skin is warm and dry.      Findings: Rash present.   Neurological:      General: No focal deficit present.      Mental Status: She is alert and oriented to person, place, and time.   Psychiatric:         Mood and Affect: Mood normal.         Behavior: Behavior normal.           Health Maintenance   Topic Date Due    Colorectal Cancer Screening  Never done    Mammogram  11/30/2024    TETANUS VACCINE  01/25/2028    Lipid Panel  10/24/2028    Hepatitis C Screening  Completed     "

## 2024-08-13 ENCOUNTER — TELEPHONE (OUTPATIENT)
Dept: FAMILY MEDICINE | Facility: CLINIC | Age: 49
End: 2024-08-13
Payer: COMMERCIAL

## 2024-08-13 DIAGNOSIS — F32.A ANXIETY AND DEPRESSION: ICD-10-CM

## 2024-08-13 DIAGNOSIS — F41.9 ANXIETY AND DEPRESSION: ICD-10-CM

## 2024-08-13 DIAGNOSIS — F43.23 SITUATIONAL MIXED ANXIETY AND DEPRESSIVE DISORDER: Primary | ICD-10-CM

## 2024-08-13 RX ORDER — BUPROPION HYDROCHLORIDE 150 MG/1
150 TABLET ORAL DAILY
Qty: 30 TABLET | Refills: 2 | Status: SHIPPED | OUTPATIENT
Start: 2024-08-13 | End: 2024-11-11

## 2024-08-13 NOTE — TELEPHONE ENCOUNTER
Attempted to call patient to let her know that her prescription was sent in. Patient did not answer, voicemail left explaining that medication was sent to pharmacy and could be picked up when ready.

## 2024-08-13 NOTE — TELEPHONE ENCOUNTER
I reviewed patient's labs in the clinic when she came in for her daughter's apt. I told her I would send in Wellbutrin to help with her anxiety and depression. Please let her know medication has been sent to [facility].     I have signed for the following orders AND/OR meds.  Please call the patient and ask the patient to schedule the testing AND/OR inform about any medications that were sent. Medications have been sent to pharmacy listed below      No orders of the defined types were placed in this encounter.      Medications Ordered This Encounter   Medications    buPROPion (WELLBUTRIN XL) 150 MG TB24 tablet     Sig: Take 1 tablet (150 mg total) by mouth once daily.     Dispense:  30 tablet     Refill:  2         [facility] #[id number] - [place], [place] - [address] AT [facility] OF [address]  [address] [facility] [address]  Phone: [phone] Fax: [phone]    [facility][id number] - [facility] [place] - [address] AT [facility] [address] [address]  [address]  [place] [address]  Phone: [phone] Fax: [phone]

## 2024-09-13 ENCOUNTER — PATIENT MESSAGE (OUTPATIENT)
Dept: FAMILY MEDICINE | Facility: CLINIC | Age: 49
End: 2024-09-13
Payer: COMMERCIAL

## 2024-09-13 DIAGNOSIS — F32.A ANXIETY AND DEPRESSION: Primary | ICD-10-CM

## 2024-09-13 DIAGNOSIS — F41.9 ANXIETY AND DEPRESSION: Primary | ICD-10-CM

## 2024-09-13 RX ORDER — DULOXETIN HYDROCHLORIDE 60 MG/1
60 CAPSULE, DELAYED RELEASE ORAL DAILY
Qty: 90 CAPSULE | Refills: 3 | Status: SHIPPED | OUTPATIENT
Start: 2024-09-13 | End: 2025-09-13

## 2024-09-13 NOTE — TELEPHONE ENCOUNTER
I have signed for the following orders AND/OR meds.  Please call the patient and ask the patient to schedule the testing AND/OR inform about any medications that were sent. Medications have been sent to pharmacy listed below      No orders of the defined types were placed in this encounter.      Medications Ordered This Encounter   Medications    DULoxetine (CYMBALTA) 60 MG capsule     Sig: Take 1 capsule (60 mg total) by mouth once daily.     Dispense:  90 capsule     Refill:  3         [facility] #[id number] - [place], [place] - [address] AT [facility] OF [address]  [address] [facility] [address]  Phone: [phone] Fax: [phone]    [id number] - [facility] [place] - [address] AT [facility] [address] [address]  [address]  [place] [address]  Phone: [phone] Fax: [phone]    [id number] - [place] - [address] AT [facility]  [address]   [place] [address]  Phone: [phone] Fax: [phone]

## 2024-11-26 DIAGNOSIS — F41.9 ANXIETY AND DEPRESSION: Primary | ICD-10-CM

## 2024-11-26 DIAGNOSIS — F32.A ANXIETY AND DEPRESSION: Primary | ICD-10-CM

## 2024-11-27 ENCOUNTER — OFFICE VISIT (OUTPATIENT)
Dept: CARDIOLOGY | Facility: CLINIC | Age: 49
End: 2024-11-27
Payer: COMMERCIAL

## 2024-11-27 ENCOUNTER — HOSPITAL ENCOUNTER (OUTPATIENT)
Dept: CARDIOLOGY | Facility: HOSPITAL | Age: 49
Discharge: HOME | End: 2024-11-27
Attending: STUDENT IN AN ORGANIZED HEALTH CARE EDUCATION/TRAINING PROGRAM
Payer: COMMERCIAL

## 2024-11-27 VITALS
SYSTOLIC BLOOD PRESSURE: 128 MMHG | HEART RATE: 86 BPM | OXYGEN SATURATION: 99 % | DIASTOLIC BLOOD PRESSURE: 84 MMHG | BODY MASS INDEX: 38.95 KG/M2 | HEIGHT: 63 IN | WEIGHT: 219.81 LBS

## 2024-11-27 DIAGNOSIS — G47.33 OSA ON CPAP: ICD-10-CM

## 2024-11-27 DIAGNOSIS — K21.9 GASTROESOPHAGEAL REFLUX DISEASE WITHOUT ESOPHAGITIS: ICD-10-CM

## 2024-11-27 DIAGNOSIS — F32.A ANXIETY AND DEPRESSION: ICD-10-CM

## 2024-11-27 DIAGNOSIS — F41.9 ANXIETY AND DEPRESSION: Primary | ICD-10-CM

## 2024-11-27 DIAGNOSIS — F41.9 ANXIETY AND DEPRESSION: ICD-10-CM

## 2024-11-27 DIAGNOSIS — E78.5 HYPERLIPIDEMIA, UNSPECIFIED HYPERLIPIDEMIA TYPE: ICD-10-CM

## 2024-11-27 DIAGNOSIS — E66.01 SEVERE OBESITY (BMI 35.0-39.9) WITH COMORBIDITY: ICD-10-CM

## 2024-11-27 DIAGNOSIS — F32.A ANXIETY AND DEPRESSION: Primary | ICD-10-CM

## 2024-11-27 DIAGNOSIS — R73.03 PREDIABETES: ICD-10-CM

## 2024-11-27 DIAGNOSIS — F17.200 TOBACCO DEPENDENCE: ICD-10-CM

## 2024-11-27 DIAGNOSIS — I10 PRIMARY HYPERTENSION: ICD-10-CM

## 2024-11-27 PROCEDURE — 1159F MED LIST DOCD IN RCRD: CPT | Mod: CPTII,S$GLB,, | Performed by: STUDENT IN AN ORGANIZED HEALTH CARE EDUCATION/TRAINING PROGRAM

## 2024-11-27 PROCEDURE — 3044F HG A1C LEVEL LT 7.0%: CPT | Mod: CPTII,S$GLB,, | Performed by: STUDENT IN AN ORGANIZED HEALTH CARE EDUCATION/TRAINING PROGRAM

## 2024-11-27 PROCEDURE — 3074F SYST BP LT 130 MM HG: CPT | Mod: CPTII,S$GLB,, | Performed by: STUDENT IN AN ORGANIZED HEALTH CARE EDUCATION/TRAINING PROGRAM

## 2024-11-27 PROCEDURE — 93005 ELECTROCARDIOGRAM TRACING: CPT

## 2024-11-27 PROCEDURE — 3079F DIAST BP 80-89 MM HG: CPT | Mod: CPTII,S$GLB,, | Performed by: STUDENT IN AN ORGANIZED HEALTH CARE EDUCATION/TRAINING PROGRAM

## 2024-11-27 PROCEDURE — 99204 OFFICE O/P NEW MOD 45 MIN: CPT | Mod: S$GLB,,, | Performed by: STUDENT IN AN ORGANIZED HEALTH CARE EDUCATION/TRAINING PROGRAM

## 2024-11-27 PROCEDURE — 93010 ELECTROCARDIOGRAM REPORT: CPT | Mod: ,,, | Performed by: INTERNAL MEDICINE

## 2024-11-27 PROCEDURE — 3008F BODY MASS INDEX DOCD: CPT | Mod: CPTII,S$GLB,, | Performed by: STUDENT IN AN ORGANIZED HEALTH CARE EDUCATION/TRAINING PROGRAM

## 2024-11-27 PROCEDURE — 4010F ACE/ARB THERAPY RXD/TAKEN: CPT | Mod: CPTII,S$GLB,, | Performed by: STUDENT IN AN ORGANIZED HEALTH CARE EDUCATION/TRAINING PROGRAM

## 2024-11-27 PROCEDURE — 99999 PR PBB SHADOW E&M-EST. PATIENT-LVL III: CPT | Mod: PBBFAC,,, | Performed by: STUDENT IN AN ORGANIZED HEALTH CARE EDUCATION/TRAINING PROGRAM

## 2024-11-27 RX ORDER — DOXAZOSIN 2 MG/1
2 TABLET ORAL NIGHTLY
Qty: 30 TABLET | Refills: 6 | Status: SHIPPED | OUTPATIENT
Start: 2024-11-27 | End: 2025-11-27

## 2024-11-27 RX ORDER — NEBIVOLOL 20 MG/1
20 TABLET ORAL DAILY
Qty: 30 TABLET | Refills: 6 | Status: SHIPPED | OUTPATIENT
Start: 2024-11-27 | End: 2025-11-27

## 2024-11-27 NOTE — PROGRESS NOTES
Section of Cardiology                  Cardiac Clinic Note    Chief Complaint/Reason for consultation: high BP      HPI:   Mckenzie Barrientos is a 49 y.o. female with h/o obesity, tobacco abuse, SUDARSHAN, prediabetes, HLD, hypertension who was referred to cardiology clinic by PCP Dr. Tellez for evaluation.      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   Having issues with BP  Was controlled on low dose lisinopril alone in the past but now having issues  Stopped amlodipine due to swelling  Metoprolol did not help BP  On chlorthalidone but sweats a lot - chlorthalidone made it worse (did not tolerate hctz)  Trying to watch salt intake- does not add salt  Cook mostly  Sodas once a daily  Walks a lot at work- not exercising (tired all the time)  Does not wear CPAP consistently - does not f/u with sleep clinic   Weight up 8 lbs since last year    Vapes nicotine, was smoking cigarettes- 20 years total (wants to quit but haven't tried)  ETOH rare    Family hx: mom's family- pacemakers  Dad's brother- heart attack/ETOH abuse      Denies chest pain, SOB, syncope, palpitations, dizziness           EKG 11/27/24 NSR, no acute ST - T wave changes    ECHO  No results found for this or any previous visit.       STRESS TEST No results found for this or any previous visit.       LHC No results found for this or any previous visit.            ROS: All 10 systems reviewed. Please refer to the HPI for pertinent positives. All other systems negative.     Past Medical History  Past Medical History:   Diagnosis Date    Acid reflux 3/1/2016    Hypertension     Kidney stones     kidney stone    Migraines     Obesity 3/1/2016    Ovarian torsion, acquired 9/27/2019    Tobacco abuse 3/1/2016       Surgical History  Past Surgical History:   Procedure Laterality Date    DIAGNOSTIC LAPAROSCOPY WITH USE OF LASER N/A 09/27/2019    Procedure: LAPAROSCOPY, DIAGNOSTIC;  Surgeon: Fab Espitia MD;  [facility];  Service: OB/GYN;  Laterality: N/A;     HYSTERECTOMY  2014    Partial    LAPAROSCOPIC LYSIS OF ADHESIONS N/A 09/27/2019    Procedure: LYSIS, ADHESIONS, LAPAROSCOPIC;  Surgeon: Fab Espitia MD;  Location: [facility] OR;  Service: OB/GYN;  Laterality: N/A;    LAPAROSCOPIC SALPINGO-OOPHORECTOMY Bilateral 09/27/2019    Procedure: SALPINGO-OOPHORECTOMY, LAPAROSCOPIC;  Surgeon: Fab Espitia MD;  Location: [facility] OR;  Service: OB/GYN;  Laterality: Bilateral;    laproscopy      LIPOMA RESECTION      Right Inner thigh    OOPHORECTOMY Bilateral 2019          Allergies:   Review of patient's allergies indicates:   Allergen Reactions    Epinephrine Palpitations    Procaine Palpitations     Other reaction(s): Heart Palations, Heart Palations    Hydrochlorothiazide Anxiety     Other reaction(s): feels like she is crawling out of her skin, feels like she is crawling out of her skin, feels like she is crawling out of her skin       Social History:  Social History     Socioeconomic History    Marital status:    Tobacco Use    Smoking status: Every Day     Types: Vaping with nicotine    Smokeless tobacco: Never    Tobacco comments:     Former cigarette smoker, 20 pack years.    Substance and Sexual Activity    Alcohol use: Yes     Alcohol/week: 4.0 standard drinks of alcohol     Types: 4 Drinks containing 0.5 oz of alcohol per week     Comment: rarely: cocktails    Drug use: No    Sexual activity: Yes     Partners: Male     Birth control/protection: See Surgical Hx     Social Drivers of Health     Financial Resource Strain: Low Risk  (1/29/2024)    Overall Financial Resource Strain (CARDIA)     Difficulty of Paying Living Expenses: Not hard at all   Food Insecurity: No Food Insecurity (1/29/2024)    Hunger Vital Sign     Worried About Running Out of Food in the Last Year: Never true     Ran Out of Food in the Last Year: Never true   Transportation Needs: No Transportation Needs (1/29/2024)    PRAPARE - Transportation     Lack of Transportation (Medical): No      Lack of Transportation (Non-Medical): No   Physical Activity: Insufficiently Active (1/29/2024)    Exercise Vital Sign     Days of Exercise per Week: 2 days     Minutes of Exercise per Session: 20 min   Stress: Stress Concern Present (1/29/2024)    [employer] - Occupational Stress Questionnaire     Feeling of Stress : To some extent   Housing Stability: Low Risk  (1/29/2024)    Housing Stability Vital Sign     Unable to Pay for Housing in the Last Year: No     Number of Places Lived in the Last Year: 1     Unstable Housing in the Last Year: No       Family History:  family history includes Breast cancer (age of onset: 41) in her sister; Cancer in her father and sister; Diabetes in her father; Leukemia in her mother; Lung cancer in her father.    Home Medications:  Current Outpatient Medications on File Prior to Visit   Medication Sig Dispense Refill    DULoxetine (CYMBALTA) 60 MG capsule Take 1 capsule (60 mg total) by mouth once daily. 90 capsule 3    fluticasone propionate (FLONASE) 50 mcg/actuation nasal spray       lisinopriL (PRINIVIL,ZESTRIL) 40 MG tablet Take 1 tablet (40 mg total) by mouth once daily. 30 tablet 11    metaxalone (SKELAXIN) 800 MG tablet metaxalone 800 mg tablet   TAKE 1 TABLET BY MOUTH 3 TIMES DAILY AS NEEDED FOR PAIN      omeprazole (PRILOSEC) 20 MG capsule Take 1 capsule by mouth once daily.      triamcinolone acetonide 0.1% (KENALOG) 0.1 % cream Apply topically 2 (two) times daily. 80 g 0    valACYclovir (VALTREX) 500 MG tablet Take 1 tablet (500 mg total) by mouth 2 (two) times daily. 60 tablet 2    [DISCONTINUED] chlorthalidone (HYGROTEN) 25 MG Tab Take 1 tablet (25 mg total) by mouth once daily. 30 tablet 11    [DISCONTINUED] metoprolol succinate (TOPROL-XL) 25 MG 24 hr tablet Take 1 tablet (25 mg total) by mouth once daily. 30 tablet 11    furosemide (LASIX) 20 MG tablet Take 1 tablet (20 mg total) by mouth daily as needed (leg swelling). (Patient not  "taking: Reported on 8/12/2024) 30 tablet 2    tiZANidine (ZANAFLEX) 2 MG tablet TAKE 1 TABLET BY MOUTH 1 TO 2 HOURS BEFORE BEDTIME AS NEEDED FOR MUSCLE PAIN OR HEADACHE 30 tablet 5     No current facility-administered medications on file prior to visit.       Physical exam:  /84 (BP Location: Right arm, Patient Position: Sitting)   Pulse 86   Ht 5' 3" (1.6 m)   Wt 99.7 kg (219 lb 12.8 oz)   SpO2 99%   BMI 38.94 kg/m²         General: Pt is a 49 y.o. year old female who is AAOx3, in NAD, is pleasant, well nourished, looks stated age  HEENT: PERRL, EOMI, Oral mucosa pink & moist  CVS  No abnormal cardiac pulsations noted on inspection. JVP not raised. The apical impulse is normal on palpation, and is located in the left 5th intercostal space in the mid - clavicular line. No palpable thrills or abnormal pulsations noted. RR, S1 - S2 heard, no murmurs, rubs or gallops appreciated.   PUL : CTA B/L. No wheezes/crackles heard   ABD : BS +, soft. No tenderness elicited   LE : No C/C/E. Distal Pulses palpable B/L         LABS:    Chemistry:   Lab Results   Component Value Date     08/12/2024    K 3.6 08/12/2024     08/12/2024    CO2 27 08/12/2024    BUN 18 08/12/2024    CREATININE 0.9 08/12/2024    CALCIUM 9.6 08/12/2024     Cardiac Markers: No results found for: "CKTOTAL", "CKMB", "CKMBINDEX", "TROPONINI"  Cardiac Markers (Last 3): No results found for: "CKTOTAL", "CKMB", "CKMBINDEX", "TROPONINI"  CBC:   Lab Results   Component Value Date    WBC 8.84 08/12/2024    HGB 13.9 08/12/2024    HCT 42.0 08/12/2024    MCV 93 08/12/2024     08/12/2024     Lipids:   Lab Results   Component Value Date    CHOL 198 08/12/2024    TRIG 190 (H) 08/12/2024    HDL 42 08/12/2024     Coagulation: No results found for: "PT", "INR", "APTT"        Assessment        1. Anxiety and depression    2. Primary hypertension    3. Hyperlipidemia, unspecified hyperlipidemia type    4. Severe obesity (BMI 35.0-39.9) with " comorbidity    5. Prediabetes    6. Gastroesophageal reflux disease without esophagitis    7. Tobacco dependence    8. SUDARSHAN on CPAP         Plan:        HTN  Stable  Stop chlorthalidone due to sweating  Cannot due amlodipine 2/2 swelling  Stop toprol xl, switch to bystolic 20 (can increase to 40 mg if needed)  Start cardura 2 mg daily (can go up to 16 mg)  Continue lisinopril 40     HLD   as of 8/24  Not currently on medication    Prediabetes  A1c 6.4  Not on medication     SUDARSHAN/fatigue   Does not wear CPAP   Needs     Obesity, Body mass index is 38.94 kg/m².   Low salt, low fat diet  Exercise as tolerated, at least 30 min daily     This note was prepared using voice recognition system and is likely to have sound alike errors that may have been overlooked even after proofreading.     I have reviewed all pertinent chart information.  Plans and recommendations have been formulated under my direct supervision. All questions answered and patient voiced understanding.   If symptoms persist go to the ED.    RTC in 1 m         Chapis Magaña MD  Cardiology

## 2024-11-28 LAB
OHS QRS DURATION: 92 MS
OHS QTC CALCULATION: 440 MS

## 2024-12-06 ENCOUNTER — TELEPHONE (OUTPATIENT)
Dept: CARDIOLOGY | Facility: CLINIC | Age: 49
End: 2024-12-06
Payer: COMMERCIAL

## 2024-12-06 NOTE — TELEPHONE ENCOUNTER
Called PT to see if she was available to come to her apt on 12/17/24 at 230 instead of 330 so that the 230 PT Mccabe can come at at 330 time slot due to being at work still.

## 2024-12-09 ENCOUNTER — PATIENT MESSAGE (OUTPATIENT)
Dept: CARDIOLOGY | Facility: CLINIC | Age: 49
End: 2024-12-09
Payer: COMMERCIAL

## 2024-12-11 ENCOUNTER — TELEPHONE (OUTPATIENT)
Dept: FAMILY MEDICINE | Facility: CLINIC | Age: 49
End: 2024-12-11
Payer: COMMERCIAL

## 2024-12-11 ENCOUNTER — TELEPHONE (OUTPATIENT)
Dept: PRIMARY CARE CLINIC | Facility: CLINIC | Age: 49
End: 2024-12-11
Payer: COMMERCIAL

## 2024-12-11 DIAGNOSIS — M25.462 PAIN AND SWELLING OF KNEE, LEFT: Primary | ICD-10-CM

## 2024-12-11 DIAGNOSIS — M25.562 PAIN AND SWELLING OF KNEE, LEFT: Primary | ICD-10-CM

## 2024-12-11 NOTE — TELEPHONE ENCOUNTER
----- Message from Kallie sent at 12/11/2024  2:53 PM CST -----  Contact: Mckenzie Rincon called to let the office know that she is unable to log onto the Hung kishan due to the kishan being down at this time. please call her at [phone].  thanks   am

## 2024-12-11 NOTE — TELEPHONE ENCOUNTER
I have signed for the following orders AND/OR meds.  Please call the patient and ask the patient to schedule the testing AND/OR inform about any medications that were sent. Medications have been sent to pharmacy listed below      Orders Placed This Encounter   Procedures    Ambulatory referral/consult to Orthopedics     Standing Status:   Future     Standing Expiration Date:   1/11/2026     Referral Priority:   Routine     Referral Type:   Consultation     Requested Specialty:   Orthopedic Surgery     Number of Visits Requested:   1              [facility] #[id number] - [place], [place] - [address] AT [facility] OF [address]  [address]  [facility] [address]  Phone: [phone] Fax: [phone]    [facility] #[id number] - [place] - [address] AT [facility] [address]  [address]  [place] [address]  Phone: [phone] Fax: [phone]    [id number] - [place] - [address] [facility]  [address]  [place] [address]  Phone: [phone] Fax: [phone]

## 2024-12-13 DIAGNOSIS — M25.562 LEFT KNEE PAIN, UNSPECIFIED CHRONICITY: Primary | ICD-10-CM

## 2024-12-15 ENCOUNTER — PATIENT MESSAGE (OUTPATIENT)
Dept: ADMINISTRATIVE | Facility: HOSPITAL | Age: 49
End: 2024-12-15
Payer: COMMERCIAL

## 2024-12-16 ENCOUNTER — HOSPITAL ENCOUNTER (OUTPATIENT)
Dept: RADIOLOGY | Facility: HOSPITAL | Age: 49
Discharge: HOME | End: 2024-12-16
Attending: STUDENT IN AN ORGANIZED HEALTH CARE EDUCATION/TRAINING PROGRAM
Payer: COMMERCIAL

## 2024-12-16 ENCOUNTER — OFFICE VISIT (OUTPATIENT)
Dept: SPORTS MEDICINE | Facility: CLINIC | Age: 49
End: 2024-12-16
Payer: COMMERCIAL

## 2024-12-16 DIAGNOSIS — Z12.11 SCREENING FOR COLON CANCER: ICD-10-CM

## 2024-12-16 DIAGNOSIS — M25.562 PAIN AND SWELLING OF KNEE, LEFT: ICD-10-CM

## 2024-12-16 DIAGNOSIS — M25.562 LEFT KNEE PAIN, UNSPECIFIED CHRONICITY: ICD-10-CM

## 2024-12-16 DIAGNOSIS — M25.462 PAIN AND SWELLING OF KNEE, LEFT: ICD-10-CM

## 2024-12-16 PROCEDURE — 3044F HG A1C LEVEL LT 7.0%: CPT | Mod: CPTII,S$GLB,, | Performed by: STUDENT IN AN ORGANIZED HEALTH CARE EDUCATION/TRAINING PROGRAM

## 2024-12-16 PROCEDURE — 99999 PR PBB SHADOW E&M-EST. PATIENT-LVL III: CPT | Mod: PBBFAC,,, | Performed by: STUDENT IN AN ORGANIZED HEALTH CARE EDUCATION/TRAINING PROGRAM

## 2024-12-16 PROCEDURE — 73562 X-RAY EXAM OF KNEE 3: CPT | Mod: 26,59,RT, | Performed by: RADIOLOGY

## 2024-12-16 PROCEDURE — 99203 OFFICE O/P NEW LOW 30 MIN: CPT | Mod: 25,S$GLB,, | Performed by: STUDENT IN AN ORGANIZED HEALTH CARE EDUCATION/TRAINING PROGRAM

## 2024-12-16 PROCEDURE — 73564 X-RAY EXAM KNEE 4 OR MORE: CPT | Mod: TC,LT

## 2024-12-16 PROCEDURE — 73564 X-RAY EXAM KNEE 4 OR MORE: CPT | Mod: 26,LT,, | Performed by: RADIOLOGY

## 2024-12-16 PROCEDURE — 4010F ACE/ARB THERAPY RXD/TAKEN: CPT | Mod: CPTII,S$GLB,, | Performed by: STUDENT IN AN ORGANIZED HEALTH CARE EDUCATION/TRAINING PROGRAM

## 2024-12-16 PROCEDURE — 97760 ORTHOTIC MGMT&TRAING 1ST ENC: CPT | Mod: S$GLB,,, | Performed by: STUDENT IN AN ORGANIZED HEALTH CARE EDUCATION/TRAINING PROGRAM

## 2024-12-16 PROCEDURE — 1159F MED LIST DOCD IN RCRD: CPT | Mod: CPTII,S$GLB,, | Performed by: STUDENT IN AN ORGANIZED HEALTH CARE EDUCATION/TRAINING PROGRAM

## 2024-12-16 NOTE — PROGRESS NOTES
Patient ID: Mckenzie Barrientos  YOB: 1975  MRN: [id number]    Chief Complaint: Pain of the Left Knee      Referred By:  Primary care for left knee    History of Present Illness:     History of Present Illness    CHIEF COMPLAINT:  - Mckenzie presents today for initial evaluation of left knee pain and swelling that began in November.    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 She describes a specific pain in the MCL area that swelled up and became significantly firm that night. The knee is still swollen and firm, particularly when bent. She denies any specific injury or incident, mentioning only that she was holding a heavy lead-lined x-ray door open when the pain started. She expresses concern about potential growth or fluid in the knee and reports some giving out of the knee, but not significantly. Mckenzie denies any prior knee issues beyond occasional cracking, and reports no fever, chills, or signs of infection such as redness or heat in the area. She experiences tenderness in the knee, particularly in the MCL area and slightly below, with some discomfort during certain movements, especially with stress test on the MCL.  She denies fever, chills, redness or heat in the knee area, rashes, or prior knee pain. Mckenzie denies any history of knee problems or injuries.    WORK STATUS:  - X-ray technician, primarily in surgery  - Previously on contract in [place]  - Currently on contract at [facility] (likely [facility])      ROS:  ROS as indicated in HPI.          Past Medical History:   Past Medical History:   Diagnosis Date    Acid reflux 3/1/2016    Hypertension     Kidney stones     kidney stone    Migraines     Obesity 3/1/2016    Ovarian torsion, acquired 9/27/2019    Tobacco abuse 3/1/2016     Past Surgical History:   Procedure Laterality Date    DIAGNOSTIC LAPAROSCOPY WITH USE OF LASER  N/A 09/27/2019    Procedure: LAPAROSCOPY, DIAGNOSTIC;  Surgeon: Fab Espitia MD;  Location: [facility] OR;  Service: OB/GYN;  Laterality: N/A;    HYSTERECTOMY  2014    Partial    LAPAROSCOPIC LYSIS OF ADHESIONS N/A 09/27/2019    Procedure: LYSIS, ADHESIONS, LAPAROSCOPIC;  Surgeon: Fab Espitia MD;  Location: [facility] OR;  Service: OB/GYN;  Laterality: N/A;    LAPAROSCOPIC SALPINGO-OOPHORECTOMY Bilateral 09/27/2019    Procedure: SALPINGO-OOPHORECTOMY, LAPAROSCOPIC;  Surgeon: Fab Espitia MD;  Location: [facility] OR;  Service: OB/GYN;  Laterality: Bilateral;    laproscopy      LIPOMA RESECTION      Right Inner thigh    OOPHORECTOMY Bilateral 2019     Family History   Problem Relation Name Age of Onset    Leukemia Mother      Lung cancer Father Darwin     Diabetes Father Darwin     Cancer Father Darwin         Lung ca    Breast cancer Sister  41    Cancer Sister Leandra         Breast ca     Social History     Socioeconomic History    Marital status:    Tobacco Use    Smoking status: Every Day     Types: Vaping with nicotine    Smokeless tobacco: Never    Tobacco comments:     Former cigarette smoker, 20 pack years.    Substance and Sexual Activity    Alcohol use: Yes     Alcohol/week: 4.0 standard drinks of alcohol     Types: 4 Drinks containing 0.5 oz of alcohol per week     Comment: rarely: cocktails    Drug use: No    Sexual activity: Yes     Partners: Male     Birth control/protection: See Surgical Hx     Social Drivers of Health     Financial Resource Strain: Low Risk  (1/29/2024)    Overall Financial Resource Strain (CARDIA)     Difficulty of Paying Living Expenses: Not hard at all   Food Insecurity: No Food Insecurity (1/29/2024)    Hunger Vital Sign     Worried About Running Out of Food in the Last Year: Never true     Ran Out of Food in the Last Year: Never true   Transportation Needs: No Transportation Needs (1/29/2024)    PRAPARE - Transportation     Lack of Transportation (Medical): No     Lack of  Transportation (Non-Medical): No   Physical Activity: Insufficiently Active (1/29/2024)    Exercise Vital Sign     Days of Exercise per Week: 2 days     Minutes of Exercise per Session: 20 min   Stress: Stress Concern Present (1/29/2024)    [place] [place] [employer] - Occupational Stress Questionnaire     Feeling of Stress : To some extent   Housing Stability: Low Risk  (1/29/2024)    Housing Stability Vital Sign     Unable to Pay for Housing in the Last Year: No     Number of Places Lived in the Last Year: 1     Unstable Housing in the Last Year: No     Medication List with Changes/Refills   Current Medications    DOXAZOSIN (CARDURA) 2 MG TABLET    Take 1 tablet (2 mg total) by mouth every evening.    DULOXETINE (CYMBALTA) 60 MG CAPSULE    Take 1 capsule (60 mg total) by mouth once daily.    FLUTICASONE PROPIONATE (FLONASE) 50 MCG/ACTUATION NASAL SPRAY        FUROSEMIDE (LASIX) 20 MG TABLET    Take 1 tablet (20 mg total) by mouth daily as needed (leg swelling).    LISINOPRIL (PRINIVIL,ZESTRIL) 40 MG TABLET    Take 1 tablet (40 mg total) by mouth once daily.    METAXALONE (SKELAXIN) 800 MG TABLET    metaxalone 800 mg tablet   TAKE 1 TABLET BY MOUTH 3 TIMES DAILY AS NEEDED FOR PAIN    NEBIVOLOL (BYSTOLIC) 20 MG TAB    Take 1 tablet (20 mg total) by mouth once daily.    OMEPRAZOLE (PRILOSEC) 20 MG CAPSULE    Take 1 capsule by mouth once daily.    TIZANIDINE (ZANAFLEX) 2 MG TABLET    TAKE 1 TABLET BY MOUTH 1 TO 2 HOURS BEFORE BEDTIME AS NEEDED FOR MUSCLE PAIN OR HEADACHE    TRIAMCINOLONE ACETONIDE 0.1% (KENALOG) 0.1 % CREAM    Apply topically 2 (two) times daily.    VALACYCLOVIR (VALTREX) 500 MG TABLET    Take 1 tablet (500 mg total) by mouth 2 (two) times daily.     Review of patient's allergies indicates:   Allergen Reactions    Epinephrine Palpitations    Procaine Palpitations     Other reaction(s): Heart Palations, Heart Palations    Hydrochlorothiazide Anxiety     Other reaction(s): feels like she  is crawling out of her skin, feels like she is crawling out of her skin, feels like she is crawling out of her skin       Physical Exam:   There is no height or weight on file to calculate BMI.    GENERAL: Well appearing, in no acute distress.  HEAD: Normocephalic and atraumatic.  ENT: External ears and nose grossly normal.  EYES: EOMI bilaterally  PULMONARY: Respirations are grossly even and non-labored.  NEURO: Awake, alert, and oriented x 3.  SKIN: No obvious rashes appreciated.  PSYCH: Mood & affect are appropriate.    Detailed MSK exam:     Left knee exam no large effusion full range of motion flexion-extension tenderness over the proximal MCL abductor myotendinous junction as well as medial joint line.  Negative Lachman's negative varus stress positive valgus stress for pain no gross instability negative anterior posterior drawer.  Tenderness over the medial joint line no significant soft tissue swelling.  Negative Mili's    Imaging:  X-ray Knee Ortho Left with Flexion  Narrative: EXAMINATION:  XR KNEE ORTHO LEFT WITH FLEXION    CLINICAL HISTORY:  . Pain in left knee    TECHNIQUE:  AP standing view of both knees, PA flexion standing views of both knees, and Merchant views of both knees were performed. A lateral view of the left knee was also performed.    COMPARISON:  None    FINDINGS:  No acute abnormality or significant arthritic change.  Impression: As above    Electronically signed by: Burton Bangura MD  Date:    12/16/2024  Time:    15:31      Physical Exam    IMAGING:  - X-rays left knee: Taken on the day of the visit         Relevant imaging results were reviewed and interpreted by me and per my read as above.  This was discussed with the patient and / or family today.     Assessment:  Mckenzie Barrientos is a 49 y.o. female presents today for left medial knee pain consistent with subacute MCL sprain proximal.  Discussed the diagnosis prognosis as well as conservative treatment options moving  forward.  Discussed moving forward with a hinged knee brace for the next 2 weeks continue with daily activity will follow up with me for repeat examination.    Pain and swelling of knee, left  -     Ambulatory referral/consult to Orthopedics  -     Sports Medicine US - Extremity Non-Vascular LIMITED Left       At least 10 minutes were spent sizing, fitting, and educating for durable medical equipment application today.  CPT 24963.      A copy of today's visit note has been sent to the referring provider.       Tim Boyle MD    This note was generated with the assistance of ambient listening technology. Verbal consent was obtained by the patient and accompanying visitor(s) for the recording of patient appointment to facilitate this note. I attest to having reviewed and edited the generated note for accuracy, though some syntax or spelling errors may persist. Please contact the author of this note for any clarification.       Disclaimer: This note was prepared using a voice recognition system and is likely to have sound alike errors within the text.

## 2024-12-17 ENCOUNTER — PATIENT MESSAGE (OUTPATIENT)
Dept: SPORTS MEDICINE | Facility: CLINIC | Age: 49
End: 2024-12-17
Payer: COMMERCIAL

## 2024-12-30 ENCOUNTER — TELEPHONE (OUTPATIENT)
Dept: SPORTS MEDICINE | Facility: CLINIC | Age: 49
End: 2024-12-30
Payer: COMMERCIAL

## 2024-12-30 NOTE — TELEPHONE ENCOUNTER
----- Message from Kirk sent at 12/30/2024  1:27 PM CST -----  Contact: dee  Type:  Patient Returning Call    Who Called:dee  Who Left Message for Patient:unknown  Does the patient know what this is regarding?:appt today  Would the patient rather a call back or a response via [facility]? Call back  Best Call Back Number:[phone]  Additional Information:

## 2025-01-06 ENCOUNTER — PATIENT MESSAGE (OUTPATIENT)
Dept: SPORTS MEDICINE | Facility: CLINIC | Age: 50
End: 2025-01-06
Payer: COMMERCIAL

## 2025-01-06 ENCOUNTER — OFFICE VISIT (OUTPATIENT)
Dept: CARDIOLOGY | Facility: CLINIC | Age: 50
End: 2025-01-06
Payer: COMMERCIAL

## 2025-01-06 ENCOUNTER — TELEPHONE (OUTPATIENT)
Dept: SPORTS MEDICINE | Facility: CLINIC | Age: 50
End: 2025-01-06
Payer: COMMERCIAL

## 2025-01-06 ENCOUNTER — OFFICE VISIT (OUTPATIENT)
Dept: SPORTS MEDICINE | Facility: CLINIC | Age: 50
End: 2025-01-06
Payer: COMMERCIAL

## 2025-01-06 VITALS
WEIGHT: 221.31 LBS | HEART RATE: 62 BPM | BODY MASS INDEX: 39.21 KG/M2 | SYSTOLIC BLOOD PRESSURE: 102 MMHG | DIASTOLIC BLOOD PRESSURE: 70 MMHG | HEIGHT: 63 IN

## 2025-01-06 VITALS — WEIGHT: 221.31 LBS | BODY MASS INDEX: 39.21 KG/M2 | RESPIRATION RATE: 17 BRPM | HEIGHT: 63 IN

## 2025-01-06 DIAGNOSIS — E78.5 HYPERLIPIDEMIA, UNSPECIFIED HYPERLIPIDEMIA TYPE: ICD-10-CM

## 2025-01-06 DIAGNOSIS — I10 PRIMARY HYPERTENSION: Primary | ICD-10-CM

## 2025-01-06 DIAGNOSIS — S83.412A SPRAIN OF MEDIAL COLLATERAL LIGAMENT OF LEFT KNEE, INITIAL ENCOUNTER: Primary | ICD-10-CM

## 2025-01-06 DIAGNOSIS — E66.01 SEVERE OBESITY (BMI 35.0-39.9) WITH COMORBIDITY: ICD-10-CM

## 2025-01-06 DIAGNOSIS — F17.200 TOBACCO DEPENDENCE: ICD-10-CM

## 2025-01-06 DIAGNOSIS — G47.33 OSA ON CPAP: ICD-10-CM

## 2025-01-06 PROCEDURE — 4010F ACE/ARB THERAPY RXD/TAKEN: CPT | Mod: CPTII,S$GLB,, | Performed by: PHYSICIAN ASSISTANT

## 2025-01-06 PROCEDURE — 99212 OFFICE O/P EST SF 10 MIN: CPT | Mod: S$GLB,,, | Performed by: PHYSICIAN ASSISTANT

## 2025-01-06 PROCEDURE — 1160F RVW MEDS BY RX/DR IN RCRD: CPT | Mod: CPTII,S$GLB,, | Performed by: PHYSICIAN ASSISTANT

## 2025-01-06 PROCEDURE — 3008F BODY MASS INDEX DOCD: CPT | Mod: CPTII,S$GLB,, | Performed by: PHYSICIAN ASSISTANT

## 2025-01-06 PROCEDURE — 3078F DIAST BP <80 MM HG: CPT | Mod: CPTII,S$GLB,,

## 2025-01-06 PROCEDURE — 3074F SYST BP LT 130 MM HG: CPT | Mod: CPTII,S$GLB,,

## 2025-01-06 PROCEDURE — 99214 OFFICE O/P EST MOD 30 MIN: CPT | Mod: S$GLB,,,

## 2025-01-06 PROCEDURE — 4010F ACE/ARB THERAPY RXD/TAKEN: CPT | Mod: CPTII,S$GLB,,

## 2025-01-06 PROCEDURE — 3008F BODY MASS INDEX DOCD: CPT | Mod: CPTII,S$GLB,,

## 2025-01-06 PROCEDURE — 1159F MED LIST DOCD IN RCRD: CPT | Mod: CPTII,S$GLB,, | Performed by: PHYSICIAN ASSISTANT

## 2025-01-06 PROCEDURE — 99999 PR PBB SHADOW E&M-EST. PATIENT-LVL III: CPT | Mod: PBBFAC,,, | Performed by: PHYSICIAN ASSISTANT

## 2025-01-06 PROCEDURE — 99999 PR PBB SHADOW E&M-EST. PATIENT-LVL III: CPT | Mod: PBBFAC,,,

## 2025-01-06 RX ORDER — CHLORTHALIDONE 25 MG/1
25 TABLET ORAL
COMMUNITY
Start: 2024-12-13

## 2025-01-06 NOTE — PROGRESS NOTES
Orthopaedic Follow-Up Visit    Last Appointment:  12/16/2024  Diagnosis:  Pain and swelling of left knee, left MCL sprain  Prior Procedure:  Diagnostic MSK ultrasound, hinged knee brace    Mckenzie Barrientos is a 49 y.o. female who is here for f/u evaluation of left knee pain. The patient was last seen here by Dr. Boyle on 12/16/2024 at which point he decided to place her in a hinged knee brace and follow up closely prior to considering further treatment options. The patient returns today reporting that the symptoms has been improving.  She reports today she did not have to wear her hinged knee brace for the 1st day.  No further complaints.    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 She describes a specific pain in the MCL area that swelled up and became significantly firm that night. The knee is still swollen and firm, particularly when bent. She denies any specific injury or incident, mentioning only that she was holding a heavy lead-lined x-ray door open when the pain started. She expresses concern about potential growth or fluid in the knee and reports some giving out of the knee, but not significantly. Mckenzie denies any prior knee issues beyond occasional cracking, and reports no fever, chills, or signs of infection such as redness or heat in the area. She experiences tenderness in the knee, particularly in the MCL area and slightly below, with some discomfort during certain movements, especially with stress test on the MCL. She denies fever, chills, redness or heat in the knee area, rashes, or prior knee pain. Mckenzie denies any history of knee problems or injuries.     Patient's medications, allergies, past medical, surgical, social and family histories were reviewed and updated as appropriate.    Review of Systems   All systems reviewed were negative.  Specifically, the patient  denies fever, chills, weight loss, chest pain, shortness of breath, or dyspnea on exertion.      Past Medical History:   Diagnosis Date    Acid reflux 3/1/2016    Hypertension     Kidney stones     kidney stone    Migraines     Obesity 3/1/2016    Ovarian torsion, acquired 9/27/2019    Tobacco abuse 3/1/2016       Objective:      Physical Exam  Patient is alert and oriented, no distress. Skin is intact. Neuro is normal with no focal motor or sensory findings.    Standing exam  stance: normal alignment, no significant leg-length discrepancy  gait: no limp    Knee      RIGHT  LEFT  Skin:     Intact   Intact  ROM:     0-130  0-130  Effusion:    Neg   Neg  Medial joint line tenderness:  Neg   +, + proximal MCL tenderness  Lateral joint line tenderness:  Neg   Neg  Mili:     Neg   Neg  Patella crepitus:   Neg   Neg  Patella tenderness:   Neg   Neg  Patella grind:      Neg   Neg  Lachman:    Neg   Neg  Valgus stress:    Neg   Neg, no pain today  Varus stress:    Neg   Neg  Posterior drawer:   Neg   Neg  N-V               intact  intact  Hip:    nml    nml   Lower extremity edema: Negative negative    Neurovascular exam  - motor function grossly intact bilaterally to hip flexion, knee extension and flexion, ankle dorsiflexion and plantarflexion  - sensation intact to light touch bilaterally to femoral, tibial, tibial and peroneal distributions  - symmetrical pedal pulses    Imaging:   XR Results:  Results for orders placed during the hospital encounter of 12/16/24    X-ray Knee Ortho Left with Flexion    Narrative  EXAMINATION:  XR KNEE ORTHO LEFT WITH FLEXION    CLINICAL HISTORY:  . Pain in left knee    TECHNIQUE:  AP standing view of both knees, PA flexion standing views of both knees, and Merchant views of both knees were performed. A lateral view of the left knee was also performed.    COMPARISON:  None    FINDINGS:  No acute abnormality or significant arthritic change.    Impression  As above      Electronically  signed by: Burton Bangura MD  Date:    12/16/2024  Time:    15:31    Physician Read: I agree with the above impression.    Assessment/Plan:   Assessment:  Mckenzie Barrientos is a 49 y.o. female with left knee MCL sprain-resolving    Plan:    Discussed diagnosis and treatment options with the patient today. She is about 2 weeks out from a left knee MCL sprain.  Her physical exam today has improved today compared to prior visit. She primarily has pain in the proximal MCL attachment, only has pain with direct palpation. She no longer has pain with valgus stress or ambulation.  She reports today is the 1st day she did not have to wear her hinged knee brace, she is happy with her improvement thus far  Discussed with her that I am happy with her progress at this point, she can continue to use the hinged knee brace as needed if pain her increases or returned. She can progress activities as tolerated. She should continue her anti-inflammatories as needed for pain relief if pain returns. Discussed if at any point her pain increases a returns she should follow up to clinic for further evaluation, patient voiced understanding  Follow-up on an as-needed basis        Julia Amin PA-C  Sports Medicine Physician Assistant       Disclaimer: This note was prepared using a voice recognition system and is likely to have sound alike errors within the text.

## 2025-01-06 NOTE — PROGRESS NOTES
Subjective:   Patient ID:  Mckenzie Barrientos is a 49 y.o. female who presents for evaluation of No chief complaint on file.      HPI 50y/o whose current medical conditions include obesity, tobacco abuse, SUDARSHAN, preDM, HTN followed by Dr. Magaña in cardiology clinic. Here today for CV follow up, c/o occ episode of diaphoresis. She thinks it could be from chlorthalidone since it happened the last time she was taking it. Her BP is stable in clinic today, on the lower side but denies any dizziness, palpitations or syncope. No chest pain. She would like to stay established with cardiology to monitor HTN and potentially change regimen in the future if diaphoresis does not resolve. She wants to start exercising and try to lose weight as well    She has not been on cardura or using lasix    Past Medical History:   Diagnosis Date    Acid reflux 3/1/2016    Hypertension     Kidney stones     kidney stone    Migraines     Obesity 3/1/2016    Ovarian torsion, acquired 9/27/2019    Tobacco abuse 3/1/2016       Past Surgical History:   Procedure Laterality Date    DIAGNOSTIC LAPAROSCOPY WITH USE OF LASER N/A 09/27/2019    Procedure: LAPAROSCOPY, DIAGNOSTIC;  Surgeon: Fab Espitia MD;  Location: [facility] OR;  Service: OB/GYN;  Laterality: N/A;    HYSTERECTOMY  2014    Partial    LAPAROSCOPIC LYSIS OF ADHESIONS N/A 09/27/2019    Procedure: LYSIS, ADHESIONS, LAPAROSCOPIC;  Surgeon: Fab Espitia MD;  Location: [facility] OR;  Service: OB/GYN;  Laterality: N/A;    LAPAROSCOPIC SALPINGO-OOPHORECTOMY Bilateral 09/27/2019    Procedure: SALPINGO-OOPHORECTOMY, LAPAROSCOPIC;  Surgeon: Fab Espitia MD;  Location: [facility] OR;  Service: OB/GYN;  Laterality: Bilateral;    laproscopy      LIPOMA RESECTION      Right Inner thigh    OOPHORECTOMY Bilateral 2019       Social History     Tobacco Use    Smoking status: Every Day     Types: Vaping with nicotine    Smokeless tobacco: Never    Tobacco comments:     Former cigarette smoker, 20 pack  years.    Substance Use Topics    Alcohol use: Yes     Alcohol/week: 4.0 standard drinks of alcohol     Types: 4 Drinks containing 0.5 oz of alcohol per week     Comment: rarely: cocktails    Drug use: No       Family History   Problem Relation Name Age of Onset    Leukemia Mother      Lung cancer Father Darwin     Diabetes Father Darwin     Cancer Father Darwin         Lung ca    Breast cancer Sister  41    Cancer Sister Leandra         Breast ca       Current Outpatient Medications on File Prior to Visit   Medication Sig Dispense Refill    chlorthalidone (HYGROTEN) 25 MG Tab Take 25 mg by mouth.      DULoxetine (CYMBALTA) 60 MG capsule Take 1 capsule (60 mg total) by mouth once daily. 90 capsule 3    fluticasone propionate (FLONASE) 50 mcg/actuation nasal spray       lisinopriL (PRINIVIL,ZESTRIL) 40 MG tablet Take 1 tablet (40 mg total) by mouth once daily. 30 tablet 11    metaxalone (SKELAXIN) 800 MG tablet as needed.      nebivoloL (BYSTOLIC) 20 mg Tab Take 1 tablet (20 mg total) by mouth once daily. 30 tablet 6    omeprazole (PRILOSEC) 20 MG capsule Take 1 capsule by mouth once daily.      doxazosin (CARDURA) 2 MG tablet Take 1 tablet (2 mg total) by mouth every evening. (Patient not taking: Reported on 1/6/2025) 30 tablet 6    furosemide (LASIX) 20 MG tablet Take 1 tablet (20 mg total) by mouth daily as needed (leg swelling). (Patient not taking: Reported on 8/12/2024) 30 tablet 2    tiZANidine (ZANAFLEX) 2 MG tablet TAKE 1 TABLET BY MOUTH 1 TO 2 HOURS BEFORE BEDTIME AS NEEDED FOR MUSCLE PAIN OR HEADACHE 30 tablet 5    triamcinolone acetonide 0.1% (KENALOG) 0.1 % cream Apply topically 2 (two) times daily. (Patient not taking: Reported on 1/6/2025) 80 g 0    valACYclovir (VALTREX) 500 MG tablet Take 1 tablet (500 mg total) by mouth 2 (two) times daily. 60 tablet 2     No current facility-administered medications on file prior to visit.      Wt Readings from Last 3 Encounters:   01/06/25 100.4 kg (221 lb 5.5 oz)    11/27/24 99.7 kg (219 lb 12.8 oz)   08/12/24 98 kg (216 lb)     Temp Readings from Last 3 Encounters:   08/12/24 97.9 °F (36.6 °C)   11/02/23 97.6 °F (36.4 °C) (Tympanic)   10/19/23 98 °F (36.7 °C)     BP Readings from Last 3 Encounters:   01/06/25 102/70   11/27/24 128/84   08/12/24 102/78     Pulse Readings from Last 3 Encounters:   01/06/25 62   11/27/24 86   08/12/24 60        Review of Systems   Constitutional: Positive for diaphoresis.   HENT: Negative.     Eyes: Negative.    Cardiovascular: Negative.    Respiratory: Negative.     Skin: Negative.    Musculoskeletal: Negative.    Gastrointestinal: Negative.    Genitourinary: Negative.    Neurological: Negative.    Psychiatric/Behavioral: Negative.         Objective:   Physical Exam  Vitals and nursing note reviewed.   Constitutional:       Appearance: Normal appearance.   HENT:      Head: Normocephalic.   Eyes:      Pupils: Pupils are equal, round, and reactive to light.   Cardiovascular:      Rate and Rhythm: Normal rate and regular rhythm.      Heart sounds: Normal heart sounds, S1 normal and S2 normal. No murmur heard.     No S3 or S4 sounds.   Pulmonary:      Effort: Pulmonary effort is normal.      Breath sounds: Normal breath sounds.   Abdominal:      General: Bowel sounds are normal.      Palpations: Abdomen is soft.   Musculoskeletal:         General: Normal range of motion.      Cervical back: Normal range of motion.   Skin:     Capillary Refill: Capillary refill takes less than 2 seconds.   Neurological:      General: No focal deficit present.      Mental Status: She is alert and oriented to person, place, and time.   Psychiatric:         Mood and Affect: Mood normal.         Behavior: Behavior normal.         Thought Content: Thought content normal.         Lab Results   Component Value Date    CHOL 198 08/12/2024    CHOL 211 (H) 10/24/2023    CHOL 237 (H) 06/09/2022     Lab Results   Component Value Date    HDL 42 08/12/2024    HDL 45 10/24/2023  "   HDL 43 06/09/2022     Lab Results   Component Value Date    LDLCALC 118.0 08/12/2024    LDLCALC 134.4 10/24/2023    LDLCALC 152.6 06/09/2022     Lab Results   Component Value Date    TRIG 190 (H) 08/12/2024    TRIG 158 (H) 10/24/2023    TRIG 207 (H) 06/09/2022     Lab Results   Component Value Date    CHOLHDL 21.2 08/12/2024    CHOLHDL 21.3 10/24/2023    CHOLHDL 18.1 (L) 06/09/2022       Chemistry        Component Value Date/Time     08/12/2024 0746    K 3.6 08/12/2024 0746     08/12/2024 0746    CO2 27 08/12/2024 0746    BUN 18 08/12/2024 0746    CREATININE 0.9 08/12/2024 0746     (H) 08/12/2024 0746        Component Value Date/Time    CALCIUM 9.6 08/12/2024 0746    ALKPHOS 73 08/12/2024 0746    AST 26 08/12/2024 0746    AST 29 01/04/2016 1221    ALT 53 (H) 08/12/2024 0746    BILITOT 0.4 08/12/2024 0746    ESTGFRAFRICA >60.0 06/09/2022 1013    EGFRNONAA >60.0 06/09/2022 1013          Lab Results   Component Value Date    TSH 2.104 08/12/2024     No results found for: "INR", "PROTIME"  @RESUFAST(WBC,HGB,HCT,MCV,PLT)  @LABRCNTIP(BNP,BNPTRIAGEBLO)@  CrCl cannot be calculated (Patient's most recent lab result is older than the maximum 7 days allowed.).     No results found for this or any previous visit.     No results found for this or any previous visit.     Assessment:      1. Primary hypertension    2. Severe obesity (BMI 35.0-39.9) with comorbidity    3. Hyperlipidemia, unspecified hyperlipidemia type    4. Tobacco dependence    5. SUDARSHAN on CPAP        Plan:   Primary hypertension    Severe obesity (BMI 35.0-39.9) with comorbidity    Hyperlipidemia, unspecified hyperlipidemia type    Tobacco dependence    SUDARSHAN on CPAP      Chlorthalidone, lisinopril, bystolic, low Na diet, profile BP-HTN  RF modifications  Low fat diet  Daily exercise as tolerated  Smoking cessation  CPAP- SUDARSHAN    RTC 6m or sooner if needed    Sofia Carreon, SMITHP-C [facility], Cardiology    "

## 2025-01-16 ENCOUNTER — PATIENT MESSAGE (OUTPATIENT)
Dept: ADMINISTRATIVE | Facility: HOSPITAL | Age: 50
End: 2025-01-16
Payer: COMMERCIAL

## 2025-04-22 ENCOUNTER — PATIENT MESSAGE (OUTPATIENT)
Dept: PRIMARY CARE CLINIC | Facility: CLINIC | Age: 50
End: 2025-04-22
Payer: COMMERCIAL

## 2025-04-23 ENCOUNTER — OFFICE VISIT (OUTPATIENT)
Dept: PRIMARY CARE CLINIC | Facility: CLINIC | Age: 50
End: 2025-04-23
Payer: COMMERCIAL

## 2025-04-23 ENCOUNTER — PATIENT MESSAGE (OUTPATIENT)
Dept: PRIMARY CARE CLINIC | Facility: CLINIC | Age: 50
End: 2025-04-23

## 2025-04-23 VITALS
OXYGEN SATURATION: 99 % | TEMPERATURE: 98 F | HEIGHT: 63 IN | WEIGHT: 227.38 LBS | BODY MASS INDEX: 40.29 KG/M2 | SYSTOLIC BLOOD PRESSURE: 106 MMHG | DIASTOLIC BLOOD PRESSURE: 66 MMHG | HEART RATE: 74 BPM

## 2025-04-23 DIAGNOSIS — E66.01 MORBID OBESITY WITH BMI OF 40.0-44.9, ADULT: ICD-10-CM

## 2025-04-23 DIAGNOSIS — R73.03 PREDIABETES: ICD-10-CM

## 2025-04-23 DIAGNOSIS — I10 PRIMARY HYPERTENSION: Primary | ICD-10-CM

## 2025-04-23 DIAGNOSIS — Z12.31 ENCOUNTER FOR SCREENING MAMMOGRAM FOR BREAST CANCER: ICD-10-CM

## 2025-04-23 DIAGNOSIS — R53.82 CHRONIC FATIGUE: ICD-10-CM

## 2025-04-23 PROCEDURE — 3074F SYST BP LT 130 MM HG: CPT | Mod: CPTII,S$GLB,, | Performed by: INTERNAL MEDICINE

## 2025-04-23 PROCEDURE — 3051F HG A1C>EQUAL 7.0%<8.0%: CPT | Mod: CPTII,S$GLB,, | Performed by: INTERNAL MEDICINE

## 2025-04-23 PROCEDURE — 3078F DIAST BP <80 MM HG: CPT | Mod: CPTII,S$GLB,, | Performed by: INTERNAL MEDICINE

## 2025-04-23 PROCEDURE — 1159F MED LIST DOCD IN RCRD: CPT | Mod: CPTII,S$GLB,, | Performed by: INTERNAL MEDICINE

## 2025-04-23 PROCEDURE — 4010F ACE/ARB THERAPY RXD/TAKEN: CPT | Mod: CPTII,S$GLB,, | Performed by: INTERNAL MEDICINE

## 2025-04-23 PROCEDURE — G2211 COMPLEX E/M VISIT ADD ON: HCPCS | Mod: S$GLB,,, | Performed by: INTERNAL MEDICINE

## 2025-04-23 PROCEDURE — 99214 OFFICE O/P EST MOD 30 MIN: CPT | Mod: S$GLB,,, | Performed by: INTERNAL MEDICINE

## 2025-04-23 PROCEDURE — 3008F BODY MASS INDEX DOCD: CPT | Mod: CPTII,S$GLB,, | Performed by: INTERNAL MEDICINE

## 2025-04-23 PROCEDURE — 99999 PR PBB SHADOW E&M-EST. PATIENT-LVL IV: CPT | Mod: PBBFAC,,, | Performed by: INTERNAL MEDICINE

## 2025-04-23 RX ORDER — SPIRONOLACTONE 25 MG/1
25 TABLET ORAL DAILY
Qty: 30 TABLET | Refills: 1 | Status: SHIPPED | OUTPATIENT
Start: 2025-04-23 | End: 2026-04-23

## 2025-04-25 ENCOUNTER — LAB VISIT (OUTPATIENT)
Dept: LAB | Facility: HOSPITAL | Age: 50
End: 2025-04-25
Attending: INTERNAL MEDICINE
Payer: COMMERCIAL

## 2025-04-25 DIAGNOSIS — I10 PRIMARY HYPERTENSION: ICD-10-CM

## 2025-04-25 DIAGNOSIS — R53.82 CHRONIC FATIGUE: ICD-10-CM

## 2025-04-25 DIAGNOSIS — R73.03 PREDIABETES: ICD-10-CM

## 2025-04-25 DIAGNOSIS — E66.01 MORBID OBESITY WITH BMI OF 40.0-44.9, ADULT: ICD-10-CM

## 2025-04-25 LAB
ABSOLUTE EOSINOPHIL (OHS): 0.19 K/UL
ABSOLUTE MONOCYTE (OHS): 0.66 K/UL (ref 0.3–1)
ABSOLUTE NEUTROPHIL COUNT (OHS): 5.75 K/UL (ref 1.8–7.7)
ALBUMIN SERPL BCP-MCNC: 3.5 G/DL (ref 3.5–5.2)
ALP SERPL-CCNC: 57 UNIT/L (ref 40–150)
ALT SERPL W/O P-5'-P-CCNC: 41 UNIT/L (ref 10–44)
ANION GAP (OHS): 11 MMOL/L (ref 8–16)
AST SERPL-CCNC: 30 UNIT/L (ref 11–45)
BASOPHILS # BLD AUTO: 0.07 K/UL
BASOPHILS NFR BLD AUTO: 0.8 %
BILIRUB SERPL-MCNC: 0.3 MG/DL (ref 0.1–1)
BUN SERPL-MCNC: 17 MG/DL (ref 6–20)
CALCIUM SERPL-MCNC: 9.1 MG/DL (ref 8.7–10.5)
CHLORIDE SERPL-SCNC: 103 MMOL/L (ref 95–110)
CO2 SERPL-SCNC: 27 MMOL/L (ref 23–29)
CORTIS SERPL-MCNC: 13.2 UG/DL (ref 4.3–22.4)
CREAT SERPL-MCNC: 0.8 MG/DL (ref 0.5–1.4)
EAG (OHS): 157 MG/DL (ref 68–131)
ERYTHROCYTE [DISTWIDTH] IN BLOOD BY AUTOMATED COUNT: 12.3 % (ref 11.5–14.5)
GFR SERPLBLD CREATININE-BSD FMLA CKD-EPI: >60 ML/MIN/1.73/M2
GLUCOSE SERPL-MCNC: 133 MG/DL (ref 70–110)
HBA1C MFR BLD: 7.1 % (ref 4–5.6)
HCT VFR BLD AUTO: 39.9 % (ref 37–48.5)
HGB BLD-MCNC: 13.4 GM/DL (ref 12–16)
IMM GRANULOCYTES # BLD AUTO: 0.04 K/UL (ref 0–0.04)
IMM GRANULOCYTES NFR BLD AUTO: 0.4 % (ref 0–0.5)
LYMPHOCYTES # BLD AUTO: 2.59 K/UL (ref 1–4.8)
MCH RBC QN AUTO: 31.4 PG (ref 27–31)
MCHC RBC AUTO-ENTMCNC: 33.6 G/DL (ref 32–36)
MCV RBC AUTO: 93 FL (ref 82–98)
NUCLEATED RBC (/100WBC) (OHS): 0 /100 WBC
PLATELET # BLD AUTO: 255 K/UL (ref 150–450)
PMV BLD AUTO: 10.2 FL (ref 9.2–12.9)
POTASSIUM SERPL-SCNC: 4 MMOL/L (ref 3.5–5.1)
PROT SERPL-MCNC: 6.5 GM/DL (ref 6–8.4)
RBC # BLD AUTO: 4.27 M/UL (ref 4–5.4)
RELATIVE EOSINOPHIL (OHS): 2 %
RELATIVE LYMPHOCYTE (OHS): 27.8 % (ref 18–48)
RELATIVE MONOCYTE (OHS): 7.1 % (ref 4–15)
RELATIVE NEUTROPHIL (OHS): 61.9 % (ref 38–73)
SODIUM SERPL-SCNC: 141 MMOL/L (ref 136–145)
TSH SERPL-ACNC: 2.41 UIU/ML (ref 0.4–4)
WBC # BLD AUTO: 9.3 K/UL (ref 3.9–12.7)

## 2025-04-25 PROCEDURE — 84443 ASSAY THYROID STIM HORMONE: CPT

## 2025-04-25 PROCEDURE — 80053 COMPREHEN METABOLIC PANEL: CPT

## 2025-04-25 PROCEDURE — 85025 COMPLETE CBC W/AUTO DIFF WBC: CPT

## 2025-04-25 PROCEDURE — 83036 HEMOGLOBIN GLYCOSYLATED A1C: CPT

## 2025-04-25 PROCEDURE — 36415 COLL VENOUS BLD VENIPUNCTURE: CPT

## 2025-04-25 PROCEDURE — 82533 TOTAL CORTISOL: CPT

## 2025-05-02 ENCOUNTER — RESULTS FOLLOW-UP (OUTPATIENT)
Dept: FAMILY MEDICINE | Facility: CLINIC | Age: 50
End: 2025-05-02

## 2025-05-02 NOTE — PROGRESS NOTES
Assessment/Plan:    1. Primary hypertension  Overview:  Hypertension Medications              chlorthalidone (HYGROTEN) 25 MG Tab Take 25 mg by mouth.    lisinopriL (PRINIVIL,ZESTRIL) 40 MG tablet Take 1 tablet (40 mg total) by mouth once daily.    nebivoloL (BYSTOLIC) 20 mg Tab Take 1 tablet (20 mg total) by mouth once daily.     -at goal today but reports AE with chlorthalidone  -of note, also had AE w/ HCTZ and amlodipine  -can stop chlorthalidone and start aldactone instead  -renal function ordered  -continue lifestyle modification with low sodium diet and exercise   -discussed hypertension disease course and importance of treating high blood pressure  -patient understood and advised of risk of untreated blood pressure. ER precautions were given   for symptoms of hypertensive urgency and emergency.    Orders:  -     spironolactone (ALDACTONE) 25 MG tablet; Take 1 tablet (25 mg total) by mouth once daily.  Dispense: 30 tablet; Refill: 1  -     TSH; Future; Expected date: 04/23/2025    2. Chronic fatigue   -checking labs   -nocturia but taking diuretic late in the day; changing BP meds and hopefully sleep will improve   -also consider getting updated sleep study  -     CBC Auto Differential; Standing  -     Comprehensive Metabolic Panel; Standing  -     Cortisol, 8AM; Future; Expected date: 04/23/2025    3. Morbid obesity with BMI of 40.0-44.9, adult  Overview:  General weight loss/lifestyle modification strategies discussed (elicit support from others; identify saboteurs; non-food rewards, etc).  Informal exercise measures discussed, e.g. taking stairs instead of elevator.  Regular aerobic exercise program discussed.      Orders:  -     CBC Auto Differential; Standing  -     Comprehensive Metabolic Panel; Standing  -     Cortisol, 8AM; Future; Expected date: 04/23/2025    4. Prediabetes  Overview:  Lab Results   Component Value Date    HGBA1C 6.2 (H) 10/24/2023     -managed with diet  -due for updated  A1c  -discussed considering metformin in the future to help with weight loss    Orders:  -     Hemoglobin A1C; Standing    5. Encounter for screening mammogram for breast cancer  -     Mammo Digital Screening Bilat w/ [facility] (XPD); Future; Expected date: 05/02/2025        Visit today included increased complexity associated with the care of the episodic problem(s) addressed and managing the longitudinal care of the patient due to the serious and/or complex managed problem(s). See above assessment/plan.    Follow up if symptoms worsen or fail to improve.    Violeta Tellez MD  _____________________________________________________________________________________________________________________________________________________    CC: multiple complaints    Patient is in clinic today as an established patient.    History of Present Illness  The patient presents for evaluation of fatigue, depression, excessive sweating, weight gain, dry skin, and low sex drive.    She reports a history of vitamin D deficiency, which has since normalized, with the last recorded level being 43 in 08/2024. Thyroid function was also within normal limits during the last assessment in 10/2024. She is uncertain whether her fatigue or depression symptoms manifested first, but notes that these have been persistent for several years and appear to be intensifying. Excessive sweating is also reported. Significant fatigue, weight gain, and difficulty losing weight are reported. Also reports inability to focus on any task. She has not previously taken metformin. There is a history of side effects from HCTZ and amlodipine, and spironolactone has not been tried. A previous medication resulted in a low heart rate, causing her to fall asleep, and she expresses concern about a similar occurrence with her current medications. Although a recent sleep study has not been conducted, she uses a CPAP machine and reports waking up approximately six times per night to  urinate. She is taking her chlorthalidone in the late afternoon.     Cymbalta has been used for several years, effectively managing her migraines. A negative experience with Wellbutrin is noted.    There is a history of keratosis pilaris on her arms, with recent skin changes resembling goosebumps.    A lack of sexual desire is reported, which she finds unusual. An oophorectomy has been performed, and she is not on hormone replacement therapy. A family history of breast cancer in her sister is noted. Two different types of hormone replacement therapy were tried in the past, both of which were unsuccessful.    PAST SURGICAL HISTORY:  - Oophorectomy    FAMILY HISTORY  Her sister has a history of breast cancer.     No other new complaints today.  Remaining chronic conditions have been reviewed and remain stable. Further detail as stated above.    Health Maintenance reviewed - mammogram ordered, patient to schedule appointment, Annual labs ordered.     No recent changes to medical/surgical history.    Medications Ordered Prior to Encounter[1]    Review of Systems   Constitutional:  Positive for activity change and fatigue. Negative for chills, diaphoresis, fever and unexpected weight change.   HENT:  Positive for hearing loss. Negative for congestion, ear pain, postnasal drip, rhinorrhea, sinus pain, sore throat and trouble swallowing.    Eyes:  Positive for visual disturbance. Negative for pain, discharge and redness.   Respiratory:  Negative for cough, chest tightness, shortness of breath and wheezing.    Cardiovascular:  Positive for palpitations. Negative for chest pain and leg swelling.   Gastrointestinal:  Positive for constipation and diarrhea. Negative for abdominal pain, blood in stool, nausea and vomiting.   Endocrine: Positive for heat intolerance. Negative for polydipsia and polyuria.   Genitourinary:  Negative for difficulty urinating, dysuria, hematuria and menstrual problem.   Musculoskeletal:  Positive  "for arthralgias. Negative for joint swelling and neck pain.   Skin:  Negative for rash.   Neurological:  Positive for weakness and headaches. Negative for dizziness and syncope.   Psychiatric/Behavioral:  Positive for confusion and dysphoric mood. The patient is not nervous/anxious.        Vitals:    04/23/25 1404   BP: 106/66   BP Location: Left arm   Patient Position: Sitting   Pulse: 74   Temp: 98.1 °F (36.7 °C)   SpO2: 99%   Weight: 103.1 kg (227 lb 6.5 oz)   Height: 5' 3" (1.6 m)       Wt Readings from Last 3 Encounters:   04/23/25 103.1 kg (227 lb 6.5 oz)   01/06/25 100.4 kg (221 lb 5.5 oz)   01/06/25 100.4 kg (221 lb 5.5 oz)       Physical Exam  Constitutional:       General: She is not in acute distress.     Appearance: Normal appearance. She is well-developed. She is obese.   HENT:      Head: Normocephalic and atraumatic.   Eyes:      Conjunctiva/sclera: Conjunctivae normal.   Cardiovascular:      Rate and Rhythm: Normal rate and regular rhythm.      Pulses: Normal pulses.      Heart sounds: Normal heart sounds. No murmur heard.  Pulmonary:      Effort: Pulmonary effort is normal. No respiratory distress.      Breath sounds: Normal breath sounds.   Abdominal:      General: Bowel sounds are normal. There is no distension.      Palpations: Abdomen is soft.      Tenderness: There is no abdominal tenderness.   Musculoskeletal:         General: Normal range of motion.      Cervical back: Normal range of motion and neck supple.   Skin:     General: Skin is warm and dry.      Findings: No rash.   Neurological:      General: No focal deficit present.      Mental Status: She is alert and oriented to person, place, and time.   Psychiatric:         Mood and Affect: Mood normal.         Behavior: Behavior normal.         DISCLAIMER: This note was compiled by using a speech recognition dictation system and therefore please be aware that typographical / speech recognition errors can and do occur.  Please contact me if " you see any errors specifically.  Consent was obtained for [facility] recording system prior to the visit.       [1]   Current Outpatient Medications on File Prior to Visit   Medication Sig Dispense Refill    DULoxetine (CYMBALTA) 60 MG capsule Take 1 capsule (60 mg total) by mouth once daily. 90 capsule 3    fluticasone propionate (FLONASE) 50 mcg/actuation nasal spray       lisinopriL (PRINIVIL,ZESTRIL) 40 MG tablet Take 1 tablet (40 mg total) by mouth once daily. 30 tablet 11    nebivoloL (BYSTOLIC) 20 mg Tab Take 1 tablet (20 mg total) by mouth once daily. 30 tablet 6    omeprazole (PRILOSEC) 20 MG capsule Take 1 capsule by mouth once daily.      valACYclovir (VALTREX) 500 MG tablet Take 1 tablet (500 mg total) by mouth 2 (two) times daily. (Patient not taking: Reported on 4/23/2025) 60 tablet 2     No current facility-administered medications on file prior to visit.

## 2025-05-09 DIAGNOSIS — I10 PRIMARY HYPERTENSION: ICD-10-CM

## 2025-05-09 RX ORDER — NEBIVOLOL 10 MG/1
10 TABLET ORAL
Qty: 90 TABLET | OUTPATIENT
Start: 2025-05-09

## 2025-05-09 NOTE — TELEPHONE ENCOUNTER
Refill Decision Note   Mckenzie Floyd  is requesting a refill authorization.  Brief Assessment and Rationale for Refill:  Quick Discontinue     Medication Therapy Plan: pcp signed 5/9/25      Comments:     Note composed:4:04 PM 05/09/2025

## 2025-05-09 NOTE — TELEPHONE ENCOUNTER
No care due was identified.  [facility] Embedded Care Due Messages. Reference number: [id number].   5/09/2025 2:05:32 PM CDT

## 2025-06-16 DIAGNOSIS — M25.551 BILATERAL HIP PAIN: Primary | ICD-10-CM

## 2025-06-16 DIAGNOSIS — M25.552 BILATERAL HIP PAIN: Primary | ICD-10-CM

## 2025-06-19 ENCOUNTER — HOSPITAL ENCOUNTER (OUTPATIENT)
Dept: RADIOLOGY | Facility: HOSPITAL | Age: 50
Discharge: HOME | End: 2025-06-19
Attending: ORTHOPAEDIC SURGERY
Payer: COMMERCIAL

## 2025-06-19 ENCOUNTER — OFFICE VISIT (OUTPATIENT)
Dept: ORTHOPEDICS | Facility: CLINIC | Age: 50
End: 2025-06-19
Payer: COMMERCIAL

## 2025-06-19 VITALS — BODY MASS INDEX: 40.28 KG/M2 | WEIGHT: 227.31 LBS | HEIGHT: 63 IN

## 2025-06-19 DIAGNOSIS — M25.552 BILATERAL HIP PAIN: ICD-10-CM

## 2025-06-19 DIAGNOSIS — M70.62 GREATER TROCHANTERIC BURSITIS OF LEFT HIP: Primary | ICD-10-CM

## 2025-06-19 DIAGNOSIS — M70.61 GREATER TROCHANTERIC BURSITIS OF RIGHT HIP: ICD-10-CM

## 2025-06-19 DIAGNOSIS — M25.551 BILATERAL HIP PAIN: ICD-10-CM

## 2025-06-19 PROCEDURE — 73521 X-RAY EXAM HIPS BI 2 VIEWS: CPT | Mod: 26,,, | Performed by: RADIOLOGY

## 2025-06-19 PROCEDURE — 73521 X-RAY EXAM HIPS BI 2 VIEWS: CPT | Mod: TC,PO

## 2025-06-19 PROCEDURE — 99999 PR PBB SHADOW E&M-EST. PATIENT-LVL III: CPT | Mod: PBBFAC,,, | Performed by: ORTHOPAEDIC SURGERY

## 2025-06-19 RX ADMIN — TRIAMCINOLONE ACETONIDE 40 MG: 40 INJECTION, SUSPENSION INTRA-ARTICULAR; INTRAMUSCULAR at 03:06

## 2025-06-29 ENCOUNTER — PATIENT MESSAGE (OUTPATIENT)
Dept: PRIMARY CARE CLINIC | Facility: CLINIC | Age: 50
End: 2025-06-29
Payer: COMMERCIAL

## 2025-06-29 DIAGNOSIS — I10 PRIMARY HYPERTENSION: ICD-10-CM

## 2025-06-30 RX ORDER — SPIRONOLACTONE 25 MG/1
25 TABLET ORAL DAILY
Qty: 90 TABLET | Refills: 3 | Status: SHIPPED | OUTPATIENT
Start: 2025-06-30 | End: 2026-06-30

## 2025-07-03 RX ORDER — TRIAMCINOLONE ACETONIDE 40 MG/ML
40 INJECTION, SUSPENSION INTRA-ARTICULAR; INTRAMUSCULAR
Status: DISCONTINUED | OUTPATIENT
Start: 2025-06-19 | End: 2025-07-03 | Stop reason: HOSPADM

## 2025-07-03 NOTE — PROCEDURES
Large Joint Aspiration/Injection: bilateral greater trochanteric bursa    Date/Time: 6/19/2025 3:00 PM    Performed by: Gregorio Cowart MD  Authorized by: Gregorio Cowart MD    Consent Done?:  Yes (Verbal)  Indications:  Pain  Timeout: prior to procedure the correct patient, procedure, and site was verified    Prep: patient was prepped and draped in usual sterile fashion      Local anesthesia used?: Yes    Local anesthetic:  Lidocaine 1% without epinephrine  Anesthetic total (ml):  5      Details:  Needle Size:  21 G  Approach:  Lateral  Location:  Hip  Laterality:  Bilateral  Site:  Bilateral greater trochanteric bursa  Medications (Right):  40 mg triamcinolone acetonide 40 mg/mL  Medications (Left):  40 mg triamcinolone acetonide 40 mg/mL  Patient tolerance:  Patient tolerated the procedure well with no immediate complications

## 2025-07-03 NOTE — PROGRESS NOTES
Chief Complaint   Patient presents with    Left Hip - Pain    Right Hip - Pain      HPI:   This is a 50 y.o. who presents to clinic today for bilateral hip pain for the past 2 months after no known trauma. Complains of pain while sleeping on side and when descending stairs. Pain is dull. No numbness or tingling. No associated signs or symptoms.      Past Medical History:   Diagnosis Date    Acid reflux 3/1/2016    Hypertension     Kidney stones     kidney stone    Migraines     Obesity 3/1/2016    Ovarian torsion, acquired 9/27/2019    Tobacco abuse 3/1/2016     Past Surgical History:   Procedure Laterality Date    DIAGNOSTIC LAPAROSCOPY WITH USE OF LASER N/A 09/27/2019    Procedure: LAPAROSCOPY, DIAGNOSTIC;  Surgeon: Fab Espitia MD;  Location: [facility] OR;  Service: OB/GYN;  Laterality: N/A;    HYSTERECTOMY  2014    Partial    LAPAROSCOPIC LYSIS OF ADHESIONS N/A 09/27/2019    Procedure: LYSIS, ADHESIONS, LAPAROSCOPIC;  Surgeon: Fab Espitia MD;  Location: [facility] OR;  Service: OB/GYN;  Laterality: N/A;    LAPAROSCOPIC SALPINGO-OOPHORECTOMY Bilateral 09/27/2019    Procedure: SALPINGO-OOPHORECTOMY, LAPAROSCOPIC;  Surgeon: Fab Espitia MD;  Location: [facility] OR;  Service: OB/GYN;  Laterality: Bilateral;    laproscopy      LIPOMA RESECTION      Right Inner thigh    OOPHORECTOMY Bilateral 2019     Medications Ordered Prior to Encounter[1]  Review of patient's allergies indicates:   Allergen Reactions    Epinephrine Palpitations    Procaine Palpitations     Other reaction(s): Heart Palations, Heart Palations    Hydrochlorothiazide Anxiety     Other reaction(s): feels like she is crawling out of her skin, feels like she is crawling out of her skin, feels like she is crawling out of her skin     Family History   Problem Relation Name Age of Onset    Leukemia Mother      Lung cancer Father Darwin     Diabetes Father Darwin     Cancer Father Darwin         Lung ca    Breast cancer Sister  41    Cancer Sister Leandra          Breast ca     Social History[2]    Review of Systems:  Constitutional:  Denies fever or chills   Eyes:  Denies change in visual acuity   HENT:  Denies nasal congestion or sore throat   Respiratory:  Denies cough or shortness of breath   Cardiovascular:  Denies chest pain or edema   GI:  Denies abdominal pain, nausea, vomiting, bloody stools or diarrhea   :  Denies dysuria   Integument:  Denies rash   Neurologic:  Denies headache, focal weakness or sensory changes   Endocrine:  Denies polyuria or polydipsia   Lymphatic:  Denies swollen glands   Psychiatric:  Denies depression or anxiety     Physical Exam:   Constitutional:  Well developed, well nourished, no acute distress, non-toxic appearance   Integument:  Well hydrated, no rash   Lymphatic:  No lymphadenopathy noted   Neurologic:  Alert & oriented x 3  Psychiatric:  Speech and behavior appropriate     Bilateral Hip Exam  Tenderness   The patient is experiencing tenderness in the greater trochanter.  Range of Motion   The patient has normal hip ROM.  Muscle Strength   Abduction: 4/5   Other   Erythema: absent  Sensation: normal  Pulse: present    Greater trochanteric bursitis of left hip    Greater trochanteric bursitis of right hip           Using an aseptic technique, I injected 5 cc of lidocaine 1% without and 1 cc of kenalog 40mg into the bilateral Hip. The patient tolerated this well. I will have them return to clinic as scheduled.             [1]   Current Outpatient Medications on File Prior to Visit   Medication Sig Dispense Refill    DULoxetine (CYMBALTA) 60 MG capsule Take 1 capsule (60 mg total) by mouth once daily. 90 capsule 3    fluticasone propionate (FLONASE) 50 mcg/actuation nasal spray       lisinopriL (PRINIVIL,ZESTRIL) 40 MG tablet Take 1 tablet (40 mg total) by mouth once daily. 30 tablet 11    metFORMIN (GLUCOPHAGE-XR) 500 MG ER 24hr tablet Take 1 tablet (500 mg total) by mouth daily with breakfast. 90 tablet 3    nebivoloL  (BYSTOLIC) 10 MG Tab Take 1 tablet (10 mg total) by mouth once daily. 30 tablet 0    omeprazole (PRILOSEC) 20 MG capsule Take 1 capsule by mouth once daily.       No current facility-administered medications on file prior to visit.   [2]   Social History  Socioeconomic History    Marital status:    Tobacco Use    Smoking status: Every Day     Types: Vaping with nicotine    Smokeless tobacco: Never    Tobacco comments:     Former cigarette smoker, 20 pack years.    Substance and Sexual Activity    Alcohol use: Yes     Alcohol/week: 4.0 standard drinks of alcohol     Types: 4 Drinks containing 0.5 oz of alcohol per week     Comment: rarely: cocktails    Drug use: No    Sexual activity: Yes     Partners: Male     Birth control/protection: See Surgical Hx     Social Drivers of Health     Financial Resource Strain: Low Risk  (4/23/2025)    Overall Financial Resource Strain (CARDIA)     Difficulty of Paying Living Expenses: Not hard at all   Food Insecurity: No Food Insecurity (4/23/2025)    Hunger Vital Sign     Worried About Running Out of Food in the Last Year: Never true     Ran Out of Food in the Last Year: Never true   Transportation Needs: No Transportation Needs (4/23/2025)    PRAPARE - Transportation     Lack of Transportation (Medical): No     Lack of Transportation (Non-Medical): No   Physical Activity: Insufficiently Active (4/23/2025)    Exercise Vital Sign     Days of Exercise per Week: 1 day     Minutes of Exercise per Session: 30 min   Stress: Stress Concern Present (4/23/2025)    [employer] - Occupational Stress Questionnaire     Feeling of Stress : To some extent   Housing Stability: Unknown (4/23/2025)    Housing Stability Vital Sign     Unable to Pay for Housing in the Last Year: Patient declined     Homeless in the Last Year: No

## 2025-07-20 ENCOUNTER — PATIENT MESSAGE (OUTPATIENT)
Dept: PRIMARY CARE CLINIC | Facility: CLINIC | Age: 50
End: 2025-07-20
Payer: COMMERCIAL

## 2025-07-25 ENCOUNTER — OFFICE VISIT (OUTPATIENT)
Dept: FAMILY MEDICINE | Facility: CLINIC | Age: 50
End: 2025-07-25
Payer: COMMERCIAL

## 2025-07-25 VITALS — DIASTOLIC BLOOD PRESSURE: 83 MMHG | SYSTOLIC BLOOD PRESSURE: 139 MMHG

## 2025-07-25 DIAGNOSIS — G43.709 CHRONIC MIGRAINE WITHOUT AURA WITHOUT STATUS MIGRAINOSUS, NOT INTRACTABLE: ICD-10-CM

## 2025-07-25 DIAGNOSIS — F41.9 ANXIETY AND DEPRESSION: Primary | ICD-10-CM

## 2025-07-25 DIAGNOSIS — I10 PRIMARY HYPERTENSION: ICD-10-CM

## 2025-07-25 DIAGNOSIS — F51.01 PRIMARY INSOMNIA: ICD-10-CM

## 2025-07-25 DIAGNOSIS — F32.A ANXIETY AND DEPRESSION: Primary | ICD-10-CM

## 2025-07-25 DIAGNOSIS — E11.9 TYPE 2 DIABETES MELLITUS WITHOUT COMPLICATION, WITHOUT LONG-TERM CURRENT USE OF INSULIN: ICD-10-CM

## 2025-07-25 RX ORDER — TIZANIDINE 2 MG/1
2 TABLET ORAL EVERY 8 HOURS PRN
Qty: 30 TABLET | Refills: 5 | Status: SHIPPED | OUTPATIENT
Start: 2025-07-25

## 2025-07-25 RX ORDER — DULOXETIN HYDROCHLORIDE 30 MG/1
30 CAPSULE, DELAYED RELEASE ORAL DAILY
Qty: 90 CAPSULE | Refills: 3 | Status: SHIPPED | OUTPATIENT
Start: 2025-07-25 | End: 2026-07-25

## 2025-07-25 NOTE — PROGRESS NOTES
Primary Care Telemedicine Note  The patient location is: Patient Home   The chief complaint leading to consultation is: Fatigue, sleep issues and depression  Total time spent with patient: 20 minutes    Visit type: Virtual visit with synchronous audio and video  Each patient to whom he or she provides medical services by telemedicine is: (1) informed of the relationship between the physician and patient and the respective role of any other health care provider with respect to management of the patient; and (2) notified that he or she may decline to receive medical services by telemedicine and may withdraw from such care at any time.      Assessment/Plan:    1. Anxiety and depression  Overview:  -chronic, worsening  -currently remains on Cymbalta 60 mg QD  -denies any current AE  -will increase Cymbalta to 90 mg QD  -of note, has been on Wellbutrin in the past w/ AE  -has also been on Effexor and Zoloft in the past  -consider trying Effexor or switching to an SSRI in future if needed  -discussed risk of discontinuing this medication without tapering  -patient was educated, advised of side effects, and all questions were answered. Patient voiced understanding  -patient will follow up routinely and notify us if having any side effects or worsening or persistent symptoms. ER precautions were given.      Orders:  -     DULoxetine (CYMBALTA) 30 MG capsule; Take 1 capsule (30 mg total) by mouth once daily.  Dispense: 90 capsule; Refill: 3    2. Chronic migraine without aura without status migrainosus, not intractable  Overview:  -previously followed by neurology  -remains on Cymbalta and PRN muscle relaxants   -improvement of severity of migraines but still occurring frequently  -consider referral back to neurology if needed    Orders:  -     tiZANidine (ZANAFLEX) 2 MG tablet; Take 1 tablet (2 mg total) by mouth every 8 (eight) hours as needed (muscle pain or headaches).  Dispense: 30 tablet; Refill: 5  -     DULoxetine  (CYMBALTA) 30 MG capsule; Take 1 capsule (30 mg total) by mouth once daily.  Dispense: 90 capsule; Refill: 3    3. Primary hypertension  Overview:  Hypertension Medications              nebivoloL (BYSTOLIC) 10 MG Tab Take 1 tablet (10 mg total) by mouth once daily.    spironolactone (ALDACTONE) 25 MG tablet Take 1 tablet (25 mg total) by mouth once daily.    lisinopriL (PRINIVIL,ZESTRIL) 40 MG tablet Take 1 tablet (40 mg total) by mouth once daily.        -at goal today   -of note, had AE w/ HCTZ, chlorthalidone and amlodipine  -continue lifestyle modification with low sodium diet and exercise   -discussed hypertension disease course and importance of treating high blood pressure  -patient understood and advised of risk of untreated blood pressure. ER precautions were given for symptoms of hypertensive urgency and emergency.      4. Type 2 diabetes mellitus without complication, without long-term current use of insulin  Overview:  Diabetes Medications              metFORMIN (GLUCOPHAGE-XR) 500 MG ER 24hr tablet Take 1 tablet (500 mg total) by mouth daily with breakfast.          -new diagnosis; recent increase in A1c to 7.0  -recently started Metformin  -plan to continue  -see diabetic health maintenance listed below  -on statin: No  -on ACE-I/ARB: Yes  -counseling provided on importance of diabetic diet and medication compliance in order to treat diabetes  -discussed diabetes disease course and potential complications         5. Primary insomnia  Overview:  -remains on PRN muscle relaxants for headaches and pain which helps with sleep  -denies adverse effects of medication  -has tried multiple OTC medication with minimal benefit  -discussed importance of good sleep hygiene practices      Orders:  -     tiZANidine (ZANAFLEX) 2 MG tablet; Take 1 tablet (2 mg total) by mouth every 8 (eight) hours as needed (muscle pain or headaches).  Dispense: 30 tablet; Refill: 5    Visit today included increased complexity  "associated with the care of the episodic problem addressed and managing the longitudinal care of the patient due to the serious and/or complex managed problem(s).    No follow-ups on file.    Olga Curry PA-C  _____________________________________________________________________________________________________________________________________________________    CC: fatigue, sleep issues and depression    HPI: Patient is an established patient who presents today via virtual visit for fatigue, sleep issues and depression.    SLEEP AND FATIGUE:  She reports persistent fatigue and difficulty with sleep, feeling tired throughout the day but experiencing challenges falling asleep at night. She uses CPAP approximately 80% of the time but continues to have trouble initiating sleep while wearing the device. Previously found low-dose tizanidine 2 mg as needed helpful for sleep onset, which has since been discontinued.     MOOD AND ANXIETY:  She describes feeling "on edge" and "not herself" related to ongoing sleep disturbances. She reports being snippy and experiencing low motivation, stating she wants things done but lacks the desire to do them herself. She spends days off sitting on the couch, which is atypical for her. She expresses interest in potentially adjusting her current medication to better manage her mood and anxiety symptoms.    DIABETES:  Her A1C has been elevated and recently reached diabetes range. She was started on Metformin as part of diabetes management and has lost a small amount of weight. She was offered Mounjaro but declined due to nervousness about long-term effects and concerns about managing potential side effects while working out of state.    HYPERTENSION:  She reports recent medication change from Chlorthalidone to Spironolactone for blood pressure management due to adverse effects. After medication change, blood pressure initially dropped significantly. She was advised to take half dose of " "Bystolic (Nebivolol) but subsequently returned to full dose due to blood pressure rising to 140/83. She currently takes all three blood pressure medications as prescribed.    NEUROLOGIC SYMPTOMS:  She reports memory problems characterized by difficulty remembering and finding words, describing this as "migraine brain" with significant brain fog and mental fogginess. She experiences migraines with associated general pain and aches.    CURRENT MEDICATIONS:  She currently takes Cymbalta 60 mg, originally prescribed for migraines and chronic pain, Metformin for diabetes, and three blood pressure medications including Spironolactone, Lisinopril and Bystolic.    MEDICATION HISTORY:  She has previously tried Zoloft, Effexor and Wellbutrin, with Wellbutrin making her feel the closest to suicidal she has ever been. She was previously on Effexor and Zoloft years ago but cannot recall her experience with the medication.    LABS:  Recent bloodwork including cortisol level testing showed acceptable results with the exception of elevated A1C, which has increased to diabetes range.    No other complaints today.    Past Medical History:   Diagnosis Date    Acid reflux 3/1/2016    Hypertension     Kidney stones     kidney stone    Migraines     Obesity 3/1/2016    Ovarian torsion, acquired 9/27/2019    Tobacco abuse 3/1/2016     Past Surgical History:   Procedure Laterality Date    DIAGNOSTIC LAPAROSCOPY WITH USE OF LASER N/A 09/27/2019    Procedure: LAPAROSCOPY, DIAGNOSTIC;  Surgeon: Fab Espitia MD;  Location: [facility] OR;  Service: OB/GYN;  Laterality: N/A;    HYSTERECTOMY  2014    Partial    LAPAROSCOPIC LYSIS OF ADHESIONS N/A 09/27/2019    Procedure: LYSIS, ADHESIONS, LAPAROSCOPIC;  Surgeon: Fab Espitia MD;  Location: [facility] OR;  Service: OB/GYN;  Laterality: N/A;    LAPAROSCOPIC SALPINGO-OOPHORECTOMY Bilateral 09/27/2019    Procedure: SALPINGO-OOPHORECTOMY, LAPAROSCOPIC;  Surgeon: Fab Espitia MD;  Location: " [facility] OR;  Service: OB/GYN;  Laterality: Bilateral;    laproscopy      LIPOMA RESECTION      Right Inner thigh    OOPHORECTOMY Bilateral 2019     Review of patient's allergies indicates:   Allergen Reactions    Epinephrine Palpitations    Procaine Palpitations     Other reaction(s): Heart Palations, Heart Palations    Hydrochlorothiazide Anxiety     Other reaction(s): feels like she is crawling out of her skin, feels like she is crawling out of her skin, feels like she is crawling out of her skin     Social History[1]  Family History   Problem Relation Name Age of Onset    Leukemia Mother      Lung cancer Father Darwin     Diabetes Father Darwin     Cancer Father Darwin         Lung ca    Breast cancer Sister  41    Cancer Sister Leandra         Breast ca     Medications Ordered Prior to Encounter[2]    Review of Systems   Constitutional:  Positive for malaise/fatigue. Negative for chills and fever.   HENT:  Negative for hearing loss.    Eyes:  Negative for discharge.   Respiratory:  Negative for cough, shortness of breath and wheezing.    Cardiovascular:  Negative for chest pain, palpitations and leg swelling.   Gastrointestinal:  Negative for abdominal pain, blood in stool, constipation, diarrhea and vomiting.   Genitourinary:  Negative for dysuria, frequency and hematuria.   Musculoskeletal:  Negative for back pain, joint pain and neck pain.   Neurological:  Positive for weakness and headaches.   Endo/Heme/Allergies:  Negative for polydipsia.   Psychiatric/Behavioral:  Negative for depression. The patient has insomnia. The patient is not nervous/anxious.        Physical Exam  Constitutional:       General: She is not in acute distress.     Appearance: She is well-developed.   HENT:      Head: Normocephalic and atraumatic.   Pulmonary:      Effort: Pulmonary effort is normal. No respiratory distress.   Abdominal:      General: There is no distension.   Musculoskeletal:         General: Normal range of motion.       Cervical back: Normal range of motion.   Neurological:      Mental Status: She is alert and oriented to person, place, and time.   Psychiatric:         Mood and Affect: Mood normal.         Behavior: Behavior normal.         Thought Content: Thought content does not include homicidal or suicidal ideation.         The patient's Health Maintenance was reviewed and the following appears to be due at this time:  Health Maintenance Due   Topic Date Due    HIV Screening  Never done    Pneumococcal Vaccines (Age 0-49) (2 of 2 - PPSV23) 04/11/2019    Colorectal Cancer Screening  Never done    COVID-19 Vaccine (3 - 2024-25 season) 09/01/2024     This note was generated with the assistance of ambient listening technology. Verbal consent was obtained by the patient and accompanying visitor(s) for the recording of patient appointment to facilitate this note. I attest to having reviewed and edited the generated note for accuracy, though some syntax or spelling errors may persist. Please contact the author of this note for any clarification.           [1]   Social History  Tobacco Use    Smoking status: Every Day     Types: Vaping with nicotine    Smokeless tobacco: Never    Tobacco comments:     Former cigarette smoker, 20 pack years.    Substance Use Topics    Alcohol use: Yes     Alcohol/week: 4.0 standard drinks of alcohol     Types: 4 Drinks containing 0.5 oz of alcohol per week     Comment: rarely: cocktails    Drug use: No   [2]   Current Outpatient Medications on File Prior to Visit   Medication Sig Dispense Refill    DULoxetine (CYMBALTA) 60 MG capsule Take 1 capsule (60 mg total) by mouth once daily. 90 capsule 3    fluticasone propionate (FLONASE) 50 mcg/actuation nasal spray       metFORMIN (GLUCOPHAGE-XR) 500 MG ER 24hr tablet Take 1 tablet (500 mg total) by mouth daily with breakfast. 90 tablet 3    nebivoloL (BYSTOLIC) 10 MG Tab Take 1 tablet (10 mg total) by mouth once daily. 30 tablet 0    omeprazole (PRILOSEC)  20 MG capsule Take 1 capsule by mouth once daily.      spironolactone (ALDACTONE) 25 MG tablet Take 1 tablet (25 mg total) by mouth once daily. 90 tablet 3    lisinopriL (PRINIVIL,ZESTRIL) 40 MG tablet Take 1 tablet (40 mg total) by mouth once daily. 30 tablet 11     No current facility-administered medications on file prior to visit.

## 2025-07-25 NOTE — PATIENT INSTRUCTIONS
Derrick Rincon,     If you are due for any health screening(s) below please notify me so we can arrange them to be ordered and scheduled. Most healthy patients at your age complete them, but you are free to accept or refuse.     If you can't do it, I'll definitely understand. If you can, I'd certainly appreciate it!    Tests to Keep You Healthy    Mammogram: Met on 7/10/2025  Colon Cancer Screening: ORDERED  Last Blood Pressure <= 139/89 (4/23/2025): Yes  Tobacco Cessation: NO      Its time for your colon cancer screening     Colorectal cancer is one of the leading causes of cancer death for men and women but it doesnt have to be. Screenings can prevent colorectal cancer or find it early enough to treat and cure the disease.     Our records indicate that you may be overdue for colon cancer screening. A colonoscopy or stool screening test can help identify patients at risk for developing colon cancer. Cancer screenings save lives, so schedule yours today to stay healthy.     A colonoscopy is the preferred test for detecting colon cancer. It is needed only once every 10 years if results are negative. While you are sedated, a flexible, lighted tube with a tiny camera is inserted into the rectum and advanced through the colon to look for cancers.     An alternative screening test that is used at home and returned to the lab may also be used. It detects hidden blood in bowel movements which could indicate cancer in the colon. If results are positive, you will need a colonoscopy to determine if the blood is a sign of cancer. This type of follow up (diagnostic) colonoscopy usually requires additional copays as required by your insurance provider.     If you recently had your colon cancer screening performed outside of [facility], please let your Health care team know so that they can update your health record. Please contact your PCP if you have any questions.    Were here to help you quit smoking     Our records  indicated that you are still smoking. One of the best things you can do for your health is to stop smoking and we are here to help.     Talk with your provider about our Smoking Cessation Program and how we can support you on your journey.

## 2025-07-31 ENCOUNTER — OFFICE VISIT (OUTPATIENT)
Dept: ORTHOPEDICS | Facility: CLINIC | Age: 50
End: 2025-07-31
Payer: COMMERCIAL

## 2025-07-31 ENCOUNTER — CLINICAL SUPPORT (OUTPATIENT)
Dept: PHYSICAL MEDICINE AND REHAB | Facility: CLINIC | Age: 50
End: 2025-07-31
Payer: COMMERCIAL

## 2025-07-31 VITALS
DIASTOLIC BLOOD PRESSURE: 64 MMHG | BODY MASS INDEX: 40.28 KG/M2 | HEIGHT: 63 IN | WEIGHT: 227.31 LBS | HEART RATE: 77 BPM | SYSTOLIC BLOOD PRESSURE: 103 MMHG

## 2025-07-31 DIAGNOSIS — M46.1 SACROILIITIS: Primary | ICD-10-CM

## 2025-07-31 DIAGNOSIS — M53.3 CHRONIC LEFT SI JOINT PAIN: Primary | ICD-10-CM

## 2025-07-31 DIAGNOSIS — G89.29 CHRONIC LEFT SI JOINT PAIN: Primary | ICD-10-CM

## 2025-07-31 PROCEDURE — 20611 DRAIN/INJ JOINT/BURSA W/US: CPT | Mod: LT,S$GLB,, | Performed by: PHYSICAL MEDICINE & REHABILITATION

## 2025-07-31 PROCEDURE — 99203 OFFICE O/P NEW LOW 30 MIN: CPT | Mod: 25,S$GLB,, | Performed by: PHYSICAL MEDICINE & REHABILITATION

## 2025-07-31 PROCEDURE — 1159F MED LIST DOCD IN RCRD: CPT | Mod: CPTII,S$GLB,, | Performed by: ORTHOPAEDIC SURGERY

## 2025-07-31 PROCEDURE — 1160F RVW MEDS BY RX/DR IN RCRD: CPT | Mod: CPTII,S$GLB,, | Performed by: ORTHOPAEDIC SURGERY

## 2025-07-31 PROCEDURE — 99999 PR PBB SHADOW E&M-EST. PATIENT-LVL III: CPT | Mod: PBBFAC,,, | Performed by: ORTHOPAEDIC SURGERY

## 2025-07-31 PROCEDURE — 99214 OFFICE O/P EST MOD 30 MIN: CPT | Mod: S$GLB,,, | Performed by: ORTHOPAEDIC SURGERY

## 2025-07-31 PROCEDURE — 3008F BODY MASS INDEX DOCD: CPT | Mod: CPTII,S$GLB,, | Performed by: ORTHOPAEDIC SURGERY

## 2025-07-31 PROCEDURE — 4010F ACE/ARB THERAPY RXD/TAKEN: CPT | Mod: CPTII,S$GLB,, | Performed by: ORTHOPAEDIC SURGERY

## 2025-07-31 PROCEDURE — 3051F HG A1C>EQUAL 7.0%<8.0%: CPT | Mod: CPTII,S$GLB,, | Performed by: ORTHOPAEDIC SURGERY

## 2025-07-31 PROCEDURE — 99999 PR PBB SHADOW E&M-EST. PATIENT-LVL III: CPT | Mod: PBBFAC,,, | Performed by: PHYSICAL MEDICINE & REHABILITATION

## 2025-07-31 RX ORDER — TRIAMCINOLONE ACETONIDE 40 MG/ML
40 INJECTION, SUSPENSION INTRA-ARTICULAR; INTRAMUSCULAR
Status: DISCONTINUED | OUTPATIENT
Start: 2025-07-31 | End: 2025-07-31 | Stop reason: HOSPADM

## 2025-07-31 RX ORDER — LIDOCAINE HYDROCHLORIDE 10 MG/ML
4 INJECTION, SOLUTION INFILTRATION; PERINEURAL
Status: DISCONTINUED | OUTPATIENT
Start: 2025-07-31 | End: 2025-07-31 | Stop reason: HOSPADM

## 2025-07-31 RX ADMIN — TRIAMCINOLONE ACETONIDE 40 MG: 40 INJECTION, SUSPENSION INTRA-ARTICULAR; INTRAMUSCULAR at 02:07

## 2025-07-31 RX ADMIN — LIDOCAINE HYDROCHLORIDE 4 ML: 10 INJECTION, SOLUTION INFILTRATION; PERINEURAL at 02:07

## 2025-07-31 NOTE — PROGRESS NOTES
[facility] PROCEDURE NOTE    CHIEF COMPLAINT:   Chief Complaint   Patient presents with    Hip Pain     Left SI Joint        HISTORY OF PRESENT ILLNESS: Mckenzie Barrientos is a 50 y.o. female who presents to me for planned procedure/injection of steroid for management of the below noted diagnosis.     Chronic left sided hip/buttock pain which initially started after birth of her child with pelvic discomfort. Now back predominately posterior buttock/hip wrose with prolonged sitting and ambulation. Referred by Ortho for left sacroiliac joint injection(s).    Of note, also endorsing chronic migraines previously seen by headache neurology. She is requesting further evaluation with neurology to discuss management as she is interested in Botox injections.     Medications: Medications Ordered Prior to Encounter[1]    Allergies:   Review of patient's allergies indicates:   Allergen Reactions    Epinephrine Palpitations    Procaine Palpitations     Other reaction(s): Heart Palations, Heart Palations    Hydrochlorothiazide Anxiety     Other reaction(s): feels like she is crawling out of her skin, feels like she is crawling out of her skin, feels like she is crawling out of her skin       Vitals:   Vitals:    07/31/25 1345   BP: 103/64   Pulse: 77     HIP EXAMINATION:  MUSCULOSKELETAL:     INSPECTION:         Right     Left   Localized/Generalized swelling:   -  -  Muscle contours normal and symmetrical: +  +  Erythema:      -  -  Gross deformity:    -  -    GAIT: antalgic without use of assistive device    TENDERNESS:                 Right      Left  Gluteal muscles:          -  -  SI Joints:           -  +      IMAGING:  Xr bilateral hips 06/19/2025 with noted: Sacroiliac joints are symmetric. Pubic symphysis is not widened. The femoral heads are well seated within the acetabula. No acute, displaced fracture or aggressive osseous abnormality. Large left transverse process fracture  at L5 with pseudoarthrosis and degenerative change at the articulation with the sacrum and iliac.     Data Reviewed: X-ray  Supportive Actions: Independent visualization of images or test specimens.    ASSESSMENT:   1. Chronic left SI joint pain        PLAN:   1. Procedure/injection was completed for management of above diagnosis.    2. Please see procedure note from today's visit with further details.     Left sacroiliac joint steroid    3. Discussed with neurology headache, they will reach out to patient to schedule follow up appt to discuss management.     RTC 1 month to assess response.     30 minutes of total time spent on the encounter, which includes face to face time and non-face to face time preparing to see the patient (eg, review of tests), obtaining and/or reviewing separately obtained history, documenting clinical information in the electronic or other health record, independently interpreting results (not separately reported), communicating results to the patient/family/caregiver, and/or care coordination (not separately reported).        [1]   Current Outpatient Medications on File Prior to Visit   Medication Sig Dispense Refill    DULoxetine (CYMBALTA) 30 MG capsule Take 1 capsule (30 mg total) by mouth once daily. 90 capsule 3    DULoxetine (CYMBALTA) 60 MG capsule Take 1 capsule (60 mg total) by mouth once daily. 90 capsule 3    fluticasone propionate (FLONASE) 50 mcg/actuation nasal spray       lisinopriL (PRINIVIL,ZESTRIL) 40 MG tablet Take 1 tablet (40 mg total) by mouth once daily. 30 tablet 11    metFORMIN (GLUCOPHAGE-XR) 500 MG ER 24hr tablet Take 1 tablet (500 mg total) by mouth daily with breakfast. 90 tablet 3    nebivoloL (BYSTOLIC) 10 MG Tab Take 1 tablet (10 mg total) by mouth once daily. 30 tablet 0    omeprazole (PRILOSEC) 20 MG capsule Take 1 capsule by mouth once daily.      spironolactone (ALDACTONE) 25 MG tablet Take 1 tablet (25 mg total) by mouth once daily. 90 tablet 3     tiZANidine (ZANAFLEX) 2 MG tablet Take 1 tablet (2 mg total) by mouth every 8 (eight) hours as needed (muscle pain or headaches). 30 tablet 5     No current facility-administered medications on file prior to visit.

## 2025-07-31 NOTE — PROCEDURES
Large Joint Aspiration/Injection: L sacroiliac joint    Date/Time: 7/31/2025 2:00 PM    Performed by: Starr Ravi DO  Authorized by: Starr Ravi DO    Site marked: the procedure site was marked    Timeout: prior to procedure the correct patient, procedure, and site was verified    Prep: patient was prepped and draped in usual sterile fashion      Local anesthesia used?: Yes    Local anesthetic:  Lidocaine 1% without epinephrine  Anesthetic total (ml):  2      Details:  Needle Size:  22 G  Ultrasonic Guidance for needle placement?: Yes    Images are saved and documented.  Approach:  Posterior  Location:  Hip (Left SIJ)  Site:  L hip joint (Left sacroiliac joint)  Medications:  4 mL LIDOcaine HCL 10 mg/ml (1%) 10 mg/mL (1 %); 40 mg triamcinolone acetonide 40 mg/mL  Patient tolerance:  Patient tolerated the procedure well with no immediate complications     Ultrasound guidance was used for correct needle placement, the images were saved will be uploaded to EMR.

## 2025-08-06 ENCOUNTER — PATIENT MESSAGE (OUTPATIENT)
Dept: FAMILY MEDICINE | Facility: CLINIC | Age: 50
End: 2025-08-06
Payer: COMMERCIAL

## 2025-08-06 DIAGNOSIS — M25.50 POLYARTHRALGIA: Primary | ICD-10-CM

## 2025-08-06 DIAGNOSIS — E11.9 TYPE 2 DIABETES MELLITUS WITHOUT COMPLICATION, WITHOUT LONG-TERM CURRENT USE OF INSULIN: ICD-10-CM

## 2025-08-06 DIAGNOSIS — R53.83 FATIGUE, UNSPECIFIED TYPE: ICD-10-CM

## 2025-08-06 RX ORDER — TIRZEPATIDE 2.5 MG/.5ML
2.5 INJECTION, SOLUTION SUBCUTANEOUS
Qty: 4 PEN | Refills: 5 | Status: SHIPPED | OUTPATIENT
Start: 2025-08-06 | End: 2026-01-21

## 2025-08-06 NOTE — TELEPHONE ENCOUNTER
I have signed for the following orders AND/OR meds.  Please call the patient and ask the patient to schedule the testing AND/OR inform about any medications that were sent. Medications have been sent to pharmacy listed below      No orders of the defined types were placed in this encounter.      Medications Ordered This Encounter   Medications    tirzepatide (MOUNJARO) 2.5 mg/0.5 mL PnIj     Sig: Inject 2.5 mg into the skin every 7 days.     Dispense:  4 Pen     Refill:  5     This medication typically requires a prior authorization.  Send to [facility] retail pharmacy for prior auth services, patient financial assistance, patient education, medication management, and home delivery options?:   No, I will select a different retail pharmacy         [facility] #[id number] - [place], [place] - [address] AT [facility] OF [address]  [address] [facility] [address]  Phone: [phone] Fax: [phone]    [facility] #[id number] - [place], [place] - [address] AT [facility] [address] [address]  [address]  [facility] [address]  Phone: [phone] Fax: [phone]    [id number] - [place] - [address] [facility] AT [facility] & [facility]  [address] [facility]  [place] [address]  Phone: [phone] Fax: [phone]

## 2025-08-07 NOTE — TELEPHONE ENCOUNTER
There is no test for fibromyalgia. I can check labs for other autoimmune disorders.    I have signed for the following orders AND/OR meds.  Please call the patient and ask the patient to schedule the testing AND/OR inform about any medications that were sent. Medications have been sent to pharmacy listed below      Orders Placed This Encounter   Procedures    ART Screen w/Reflex     Standing Status:   Future     Expected Date:   8/7/2025     Expiration Date:   8/7/2026     Send normal result to authorizing provider's In Basket if patient is active on MyChart::   Yes    C-Reactive Protein     Standing Status:   Future     Expected Date:   8/7/2025     Expiration Date:   10/7/2026     Send normal result to authorizing provider's In Basket if patient is active on MyChart::   Yes    Cyclic Citrullinated Peptide Antibody, IgG     Standing Status:   Future     Expected Date:   8/7/2025     Expiration Date:   10/7/2026     Send normal result to authorizing provider's In Basket if patient is active on MyChart::   Yes    Rheumatoid Factor     Standing Status:   Future     Expected Date:   8/7/2025     Expiration Date:   8/7/2026     Send normal result to authorizing provider's In Basket if patient is active on MyChart::   Yes    Sedimentation rate     Standing Status:   Future     Expected Date:   8/7/2025     Expiration Date:   8/7/2026     Send normal result to authorizing provider's In Basket if patient is active on MyChart::   Yes    Uric Acid     Standing Status:   Future     Expected Date:   8/7/2025     Expiration Date:   8/7/2026     Send normal result to authorizing provider's In Basket if patient is active on MyChart::   Yes       Medications Ordered This Encounter   Medications    tirzepatide (MOUNJARO) 2.5 mg/0.5 mL PnIj     Sig: Inject 2.5 mg into the skin every 7 days.     Dispense:  4 Pen     Refill:  5     This medication typically requires a prior authorization.  Send to [facility] for prior  auth services, patient financial assistance, patient education, medication management, and home delivery options?:   No, I will select a different retail pharmacy         [id number] - [place], [place] - [address] AT [facility] OF [address]  [address] [facility] [address]  Phone: [phone] Fax: [phone]    [id number] - [place] - [address] [facility] AT [facility] OF [address] [address]  [address] [facility]  [place] [address]  Phone: [phone] Fax: [phone]    [id number] - [place] - [address] [facility]  AT [facility] & [facility]  [address] [facility]  [place] [address]  Phone: [phone] Fax: [phone]

## 2025-08-08 ENCOUNTER — LAB VISIT (OUTPATIENT)
Dept: LAB | Facility: HOSPITAL | Age: 50
End: 2025-08-08
Attending: PHYSICIAN ASSISTANT
Payer: COMMERCIAL

## 2025-08-08 DIAGNOSIS — R53.83 FATIGUE, UNSPECIFIED TYPE: ICD-10-CM

## 2025-08-08 DIAGNOSIS — M25.50 POLYARTHRALGIA: ICD-10-CM

## 2025-08-08 LAB
CRP SERPL-MCNC: 1.5 MG/L
CYCLIC CITRULLINATED PEPTIDE (CCP) (OHS): <0.5 U/ML
ERYTHROCYTE [SEDIMENTATION RATE] IN BLOOD: 8 MM/HR
RHEUMATOID FACT SERPL-ACNC: <13 IU/ML
URATE SERPL-MCNC: 5.6 MG/DL (ref 2.4–5.7)

## 2025-08-08 PROCEDURE — 85651 RBC SED RATE NONAUTOMATED: CPT | Mod: PO

## 2025-08-08 PROCEDURE — 36415 COLL VENOUS BLD VENIPUNCTURE: CPT | Mod: PO

## 2025-08-08 PROCEDURE — 86200 CCP ANTIBODY: CPT

## 2025-08-08 PROCEDURE — 86235 NUCLEAR ANTIGEN ANTIBODY: CPT | Mod: 59

## 2025-08-08 PROCEDURE — 86431 RHEUMATOID FACTOR QUANT: CPT

## 2025-08-08 PROCEDURE — 86225 DNA ANTIBODY NATIVE: CPT

## 2025-08-08 PROCEDURE — 84550 ASSAY OF BLOOD/URIC ACID: CPT

## 2025-08-08 PROCEDURE — 86039 ANTINUCLEAR ANTIBODIES (ANA): CPT

## 2025-08-08 PROCEDURE — 86140 C-REACTIVE PROTEIN: CPT

## 2025-08-08 PROCEDURE — 86235 NUCLEAR ANTIGEN ANTIBODY: CPT

## 2025-08-11 LAB
ANA (OHS): POSITIVE
ANA PATTERN 1 (OHS): ABNORMAL
ANA TITER 1 (OHS): ABNORMAL

## 2025-08-12 LAB
DSDNA ANTIBODY (OHS): NORMAL
DSDNA ANTIBODY TITER (OHS): NORMAL
SM  ANTIBODY (OHS): 0.08 RATIO
SM INTERPRETATION (OHS): NEGATIVE
SM/RNP ANTIBODY (OHS): 0.07 RATIO
SM/RNP INTERPRETATION (OHS): NEGATIVE
SSA  ANTIBODY (OHS): 0.16 RATIO (ref 0–0.99)
SSA INTERPRETATION (OHS): NEGATIVE
SSB  ANTIBODY (OHS): 0.06 RATIO
SSB INTERPRETATION (OHS): NEGATIVE

## 2025-08-13 ENCOUNTER — RESULTS FOLLOW-UP (OUTPATIENT)
Dept: FAMILY MEDICINE | Facility: CLINIC | Age: 50
End: 2025-08-13
Payer: COMMERCIAL

## 2025-08-13 DIAGNOSIS — E11.9 TYPE 2 DIABETES MELLITUS WITHOUT COMPLICATION, WITHOUT LONG-TERM CURRENT USE OF INSULIN: Primary | ICD-10-CM

## 2025-08-17 DIAGNOSIS — I10 PRIMARY HYPERTENSION: ICD-10-CM

## 2025-08-18 DIAGNOSIS — I10 PRIMARY HYPERTENSION: ICD-10-CM

## 2025-08-18 RX ORDER — LISINOPRIL 40 MG/1
40 TABLET ORAL DAILY
Qty: 90 TABLET | Refills: 3 | Status: SHIPPED | OUTPATIENT
Start: 2025-08-18

## 2025-08-18 RX ORDER — NEBIVOLOL 10 MG/1
10 TABLET ORAL DAILY
Qty: 90 TABLET | Refills: 2 | Status: SHIPPED | OUTPATIENT
Start: 2025-08-18

## 2025-08-26 ENCOUNTER — PATIENT MESSAGE (OUTPATIENT)
Dept: ORTHOPEDICS | Facility: CLINIC | Age: 50
End: 2025-08-26
Payer: COMMERCIAL

## 2025-08-26 ENCOUNTER — PATIENT MESSAGE (OUTPATIENT)
Dept: PHYSICAL MEDICINE AND REHAB | Facility: CLINIC | Age: 50
End: 2025-08-26
Payer: COMMERCIAL

## 2025-08-29 ENCOUNTER — CLINICAL SUPPORT (OUTPATIENT)
Dept: PHYSICAL MEDICINE AND REHAB | Facility: CLINIC | Age: 50
End: 2025-08-29
Payer: COMMERCIAL

## 2025-08-29 VITALS — HEART RATE: 80 BPM | SYSTOLIC BLOOD PRESSURE: 120 MMHG | DIASTOLIC BLOOD PRESSURE: 59 MMHG

## 2025-08-29 DIAGNOSIS — M53.3 CHRONIC LEFT SI JOINT PAIN: ICD-10-CM

## 2025-08-29 DIAGNOSIS — G57.02 PIRIFORMIS SYNDROME OF LEFT SIDE: Primary | ICD-10-CM

## 2025-08-29 DIAGNOSIS — G89.29 CHRONIC LEFT SI JOINT PAIN: ICD-10-CM

## 2025-08-29 PROCEDURE — 99999 PR PBB SHADOW E&M-EST. PATIENT-LVL III: CPT | Mod: PBBFAC,,, | Performed by: PHYSICAL MEDICINE & REHABILITATION

## 2025-08-29 RX ORDER — BETAMETHASONE SODIUM PHOSPHATE AND BETAMETHASONE ACETATE 3; 3 MG/ML; MG/ML
6 INJECTION, SUSPENSION INTRA-ARTICULAR; INTRALESIONAL; INTRAMUSCULAR; SOFT TISSUE
Status: DISCONTINUED | OUTPATIENT
Start: 2025-08-29 | End: 2025-08-29 | Stop reason: HOSPADM

## 2025-08-29 RX ORDER — LIDOCAINE HYDROCHLORIDE 10 MG/ML
4 INJECTION, SOLUTION INFILTRATION; PERINEURAL
Status: DISCONTINUED | OUTPATIENT
Start: 2025-08-29 | End: 2025-08-29 | Stop reason: HOSPADM

## 2025-08-29 RX ADMIN — BETAMETHASONE SODIUM PHOSPHATE AND BETAMETHASONE ACETATE 6 MG: 3; 3 INJECTION, SUSPENSION INTRA-ARTICULAR; INTRALESIONAL; INTRAMUSCULAR; SOFT TISSUE at 11:08

## 2025-08-29 RX ADMIN — LIDOCAINE HYDROCHLORIDE 4 ML: 10 INJECTION, SOLUTION INFILTRATION; PERINEURAL at 11:08
